# Patient Record
Sex: FEMALE | Race: WHITE | NOT HISPANIC OR LATINO
[De-identification: names, ages, dates, MRNs, and addresses within clinical notes are randomized per-mention and may not be internally consistent; named-entity substitution may affect disease eponyms.]

---

## 2017-07-03 ENCOUNTER — APPOINTMENT (OUTPATIENT)
Dept: MRI IMAGING | Facility: IMAGING CENTER | Age: 62
End: 2017-07-03

## 2017-07-03 ENCOUNTER — OUTPATIENT (OUTPATIENT)
Dept: OUTPATIENT SERVICES | Facility: HOSPITAL | Age: 62
LOS: 1 days | End: 2017-07-03
Payer: COMMERCIAL

## 2017-07-03 DIAGNOSIS — Z00.8 ENCOUNTER FOR OTHER GENERAL EXAMINATION: ICD-10-CM

## 2017-07-03 PROCEDURE — 74183 MRI ABD W/O CNTR FLWD CNTR: CPT

## 2017-07-03 PROCEDURE — 82565 ASSAY OF CREATININE: CPT

## 2017-07-03 PROCEDURE — A9585: CPT

## 2017-08-31 ENCOUNTER — APPOINTMENT (OUTPATIENT)
Age: 62
End: 2017-08-31

## 2018-07-26 ENCOUNTER — RESULT REVIEW (OUTPATIENT)
Age: 63
End: 2018-07-26

## 2021-01-19 ENCOUNTER — NON-APPOINTMENT (OUTPATIENT)
Age: 66
End: 2021-01-19

## 2021-01-20 ENCOUNTER — APPOINTMENT (OUTPATIENT)
Dept: DERMATOLOGY | Facility: CLINIC | Age: 66
End: 2021-01-20
Payer: MEDICARE

## 2021-01-20 ENCOUNTER — NON-APPOINTMENT (OUTPATIENT)
Age: 66
End: 2021-01-20

## 2021-01-20 PROCEDURE — 99203 OFFICE O/P NEW LOW 30 MIN: CPT | Mod: 25,Q5

## 2021-01-20 PROCEDURE — 99072 ADDL SUPL MATRL&STAF TM PHE: CPT

## 2021-01-20 PROCEDURE — 17003 DESTRUCT PREMALG LES 2-14: CPT

## 2021-01-20 PROCEDURE — 17000 DESTRUCT PREMALG LESION: CPT

## 2021-07-12 ENCOUNTER — APPOINTMENT (OUTPATIENT)
Dept: DERMATOLOGY | Facility: CLINIC | Age: 66
End: 2021-07-12
Payer: MEDICARE

## 2021-07-12 PROCEDURE — 17000 DESTRUCT PREMALG LESION: CPT

## 2021-07-12 PROCEDURE — 17003 DESTRUCT PREMALG LES 2-14: CPT

## 2021-07-12 PROCEDURE — 99213 OFFICE O/P EST LOW 20 MIN: CPT | Mod: 25

## 2022-01-03 ENCOUNTER — APPOINTMENT (OUTPATIENT)
Dept: DERMATOLOGY | Facility: CLINIC | Age: 67
End: 2022-01-03

## 2022-01-03 ENCOUNTER — APPOINTMENT (OUTPATIENT)
Dept: DERMATOLOGY | Facility: CLINIC | Age: 67
End: 2022-01-03
Payer: MEDICARE

## 2022-01-03 DIAGNOSIS — D48.5 NEOPLASM OF UNCERTAIN BEHAVIOR OF SKIN: ICD-10-CM

## 2022-01-03 DIAGNOSIS — L57.0 ACTINIC KERATOSIS: ICD-10-CM

## 2022-01-03 PROCEDURE — 99214 OFFICE O/P EST MOD 30 MIN: CPT

## 2022-07-26 ENCOUNTER — APPOINTMENT (OUTPATIENT)
Dept: DERMATOLOGY | Facility: CLINIC | Age: 67
End: 2022-07-26

## 2022-07-26 DIAGNOSIS — L81.4 OTHER MELANIN HYPERPIGMENTATION: ICD-10-CM

## 2022-07-26 PROCEDURE — 99213 OFFICE O/P EST LOW 20 MIN: CPT

## 2023-01-25 ENCOUNTER — APPOINTMENT (OUTPATIENT)
Dept: DERMATOLOGY | Facility: CLINIC | Age: 68
End: 2023-01-25

## 2023-01-31 ENCOUNTER — APPOINTMENT (OUTPATIENT)
Dept: DERMATOLOGY | Facility: CLINIC | Age: 68
End: 2023-01-31
Payer: MEDICARE

## 2023-01-31 PROCEDURE — 99214 OFFICE O/P EST MOD 30 MIN: CPT

## 2023-02-23 ENCOUNTER — NON-APPOINTMENT (OUTPATIENT)
Age: 68
End: 2023-02-23

## 2023-07-04 ENCOUNTER — TRANSCRIPTION ENCOUNTER (OUTPATIENT)
Age: 68
End: 2023-07-04

## 2023-07-05 ENCOUNTER — APPOINTMENT (OUTPATIENT)
Dept: DERMATOLOGY | Facility: CLINIC | Age: 68
End: 2023-07-05
Payer: MEDICARE

## 2023-07-05 PROCEDURE — 99213 OFFICE O/P EST LOW 20 MIN: CPT

## 2023-09-09 ENCOUNTER — APPOINTMENT (OUTPATIENT)
Dept: CT IMAGING | Facility: IMAGING CENTER | Age: 68
End: 2023-09-09
Payer: MEDICARE

## 2023-09-09 ENCOUNTER — OUTPATIENT (OUTPATIENT)
Dept: OUTPATIENT SERVICES | Facility: HOSPITAL | Age: 68
LOS: 1 days | End: 2023-09-09
Payer: COMMERCIAL

## 2023-09-09 DIAGNOSIS — Z00.8 ENCOUNTER FOR OTHER GENERAL EXAMINATION: ICD-10-CM

## 2023-09-09 PROCEDURE — 74177 CT ABD & PELVIS W/CONTRAST: CPT

## 2023-09-09 PROCEDURE — 74177 CT ABD & PELVIS W/CONTRAST: CPT | Mod: 26

## 2023-10-16 ENCOUNTER — INPATIENT (INPATIENT)
Facility: HOSPITAL | Age: 68
LOS: 3 days | Discharge: HOME | End: 2023-10-20
Attending: INTERNAL MEDICINE | Admitting: INTERNAL MEDICINE
Payer: MEDICARE

## 2023-10-16 ENCOUNTER — OUTPATIENT (OUTPATIENT)
Dept: OUTPATIENT SERVICES | Facility: HOSPITAL | Age: 68
LOS: 1 days | End: 2023-10-16
Payer: MEDICARE

## 2023-10-16 VITALS
SYSTOLIC BLOOD PRESSURE: 105 MMHG | HEART RATE: 60 BPM | OXYGEN SATURATION: 100 % | DIASTOLIC BLOOD PRESSURE: 59 MMHG | TEMPERATURE: 98 F | RESPIRATION RATE: 16 BRPM

## 2023-10-16 DIAGNOSIS — Z79.899 OTHER LONG TERM (CURRENT) DRUG THERAPY: ICD-10-CM

## 2023-10-16 DIAGNOSIS — I50.33 ACUTE ON CHRONIC DIASTOLIC (CONGESTIVE) HEART FAILURE: ICD-10-CM

## 2023-10-16 DIAGNOSIS — R06.02 SHORTNESS OF BREATH: ICD-10-CM

## 2023-10-16 DIAGNOSIS — R53.1 WEAKNESS: ICD-10-CM

## 2023-10-16 DIAGNOSIS — E87.1 HYPO-OSMOLALITY AND HYPONATREMIA: ICD-10-CM

## 2023-10-16 DIAGNOSIS — Z90.722 ACQUIRED ABSENCE OF OVARIES, BILATERAL: Chronic | ICD-10-CM

## 2023-10-16 DIAGNOSIS — Z29.9 ENCOUNTER FOR PROPHYLACTIC MEASURES, UNSPECIFIED: ICD-10-CM

## 2023-10-16 DIAGNOSIS — I34.0 NONRHEUMATIC MITRAL (VALVE) INSUFFICIENCY: ICD-10-CM

## 2023-10-16 DIAGNOSIS — E03.9 HYPOTHYROIDISM, UNSPECIFIED: ICD-10-CM

## 2023-10-16 LAB
ALBUMIN SERPL ELPH-MCNC: 3.8 G/DL — SIGNIFICANT CHANGE UP (ref 3.3–5)
ALBUMIN SERPL ELPH-MCNC: 3.8 G/DL — SIGNIFICANT CHANGE UP (ref 3.3–5)
ALP SERPL-CCNC: 55 U/L — SIGNIFICANT CHANGE UP (ref 40–120)
ALP SERPL-CCNC: 55 U/L — SIGNIFICANT CHANGE UP (ref 40–120)
ALT FLD-CCNC: 38 U/L — HIGH (ref 4–33)
ALT FLD-CCNC: 38 U/L — HIGH (ref 4–33)
ANION GAP SERPL CALC-SCNC: 14 MMOL/L — SIGNIFICANT CHANGE UP (ref 7–14)
ANION GAP SERPL CALC-SCNC: 14 MMOL/L — SIGNIFICANT CHANGE UP (ref 7–14)
ANION GAP SERPL CALC-SCNC: 7 MMOL/L — SIGNIFICANT CHANGE UP (ref 7–14)
APPEARANCE UR: CLEAR — SIGNIFICANT CHANGE UP
APPEARANCE UR: CLEAR — SIGNIFICANT CHANGE UP
APTT BLD: 21.6 SEC — LOW (ref 24.5–35.6)
APTT BLD: 21.6 SEC — LOW (ref 24.5–35.6)
AST SERPL-CCNC: 69 U/L — HIGH (ref 4–32)
AST SERPL-CCNC: 69 U/L — HIGH (ref 4–32)
BACTERIA # UR AUTO: NEGATIVE /HPF — SIGNIFICANT CHANGE UP
BACTERIA # UR AUTO: NEGATIVE /HPF — SIGNIFICANT CHANGE UP
BILIRUB SERPL-MCNC: 0.5 MG/DL — SIGNIFICANT CHANGE UP (ref 0.2–1.2)
BILIRUB SERPL-MCNC: 0.5 MG/DL — SIGNIFICANT CHANGE UP (ref 0.2–1.2)
BILIRUB UR-MCNC: NEGATIVE — SIGNIFICANT CHANGE UP
BILIRUB UR-MCNC: NEGATIVE — SIGNIFICANT CHANGE UP
BUN SERPL-MCNC: 35 MG/DL — HIGH (ref 7–23)
BUN SERPL-MCNC: 39 MG/DL — HIGH (ref 7–23)
BUN SERPL-MCNC: 39 MG/DL — HIGH (ref 7–23)
CALCIUM SERPL-MCNC: 8.4 MG/DL — SIGNIFICANT CHANGE UP (ref 8.4–10.5)
CALCIUM SERPL-MCNC: 8.6 MG/DL — SIGNIFICANT CHANGE UP (ref 8.4–10.5)
CALCIUM SERPL-MCNC: 8.6 MG/DL — SIGNIFICANT CHANGE UP (ref 8.4–10.5)
CAST: 0 /LPF — SIGNIFICANT CHANGE UP (ref 0–4)
CAST: 0 /LPF — SIGNIFICANT CHANGE UP (ref 0–4)
CHLORIDE SERPL-SCNC: 86 MMOL/L — LOW (ref 98–107)
CHLORIDE SERPL-SCNC: 88 MMOL/L — LOW (ref 98–107)
CHLORIDE SERPL-SCNC: 88 MMOL/L — LOW (ref 98–107)
CO2 SERPL-SCNC: 27 MMOL/L — SIGNIFICANT CHANGE UP (ref 22–31)
CO2 SERPL-SCNC: 27 MMOL/L — SIGNIFICANT CHANGE UP (ref 22–31)
CO2 SERPL-SCNC: 32 MMOL/L — HIGH (ref 22–31)
COLOR SPEC: YELLOW — SIGNIFICANT CHANGE UP
COLOR SPEC: YELLOW — SIGNIFICANT CHANGE UP
CREAT SERPL-MCNC: 0.46 MG/DL — LOW (ref 0.5–1.3)
CREAT SERPL-MCNC: 0.46 MG/DL — LOW (ref 0.5–1.3)
CREAT SERPL-MCNC: 0.51 MG/DL — SIGNIFICANT CHANGE UP (ref 0.5–1.3)
DIFF PNL FLD: ABNORMAL
DIFF PNL FLD: ABNORMAL
EGFR: 102 ML/MIN/1.73M2 — SIGNIFICANT CHANGE UP
EGFR: 104 ML/MIN/1.73M2 — SIGNIFICANT CHANGE UP
EGFR: 104 ML/MIN/1.73M2 — SIGNIFICANT CHANGE UP
GLUCOSE SERPL-MCNC: 65 MG/DL — LOW (ref 70–99)
GLUCOSE SERPL-MCNC: 65 MG/DL — LOW (ref 70–99)
GLUCOSE SERPL-MCNC: 70 MG/DL — SIGNIFICANT CHANGE UP (ref 70–99)
GLUCOSE UR QL: NEGATIVE MG/DL — SIGNIFICANT CHANGE UP
GLUCOSE UR QL: NEGATIVE MG/DL — SIGNIFICANT CHANGE UP
HCT VFR BLD CALC: 30.7 % — LOW (ref 34.5–45)
HCT VFR BLD CALC: 34.5 % — SIGNIFICANT CHANGE UP (ref 34.5–45)
HCT VFR BLD CALC: 34.5 % — SIGNIFICANT CHANGE UP (ref 34.5–45)
HGB BLD-MCNC: 11.3 G/DL — LOW (ref 11.5–15.5)
HGB BLD-MCNC: 12.5 G/DL — SIGNIFICANT CHANGE UP (ref 11.5–15.5)
HGB BLD-MCNC: 12.5 G/DL — SIGNIFICANT CHANGE UP (ref 11.5–15.5)
INR BLD: 1.37 RATIO — HIGH (ref 0.85–1.18)
INR BLD: 1.37 RATIO — HIGH (ref 0.85–1.18)
KETONES UR-MCNC: NEGATIVE MG/DL — SIGNIFICANT CHANGE UP
KETONES UR-MCNC: NEGATIVE MG/DL — SIGNIFICANT CHANGE UP
LEUKOCYTE ESTERASE UR-ACNC: NEGATIVE — SIGNIFICANT CHANGE UP
LEUKOCYTE ESTERASE UR-ACNC: NEGATIVE — SIGNIFICANT CHANGE UP
MAGNESIUM SERPL-MCNC: 2.6 MG/DL — SIGNIFICANT CHANGE UP (ref 1.6–2.6)
MAGNESIUM SERPL-MCNC: 2.6 MG/DL — SIGNIFICANT CHANGE UP (ref 1.6–2.6)
MCHC RBC-ENTMCNC: 35.6 PG — HIGH (ref 27–34)
MCHC RBC-ENTMCNC: 35.7 PG — HIGH (ref 27–34)
MCHC RBC-ENTMCNC: 35.7 PG — HIGH (ref 27–34)
MCHC RBC-ENTMCNC: 36.2 GM/DL — HIGH (ref 32–36)
MCHC RBC-ENTMCNC: 36.2 GM/DL — HIGH (ref 32–36)
MCHC RBC-ENTMCNC: 36.8 GM/DL — HIGH (ref 32–36)
MCV RBC AUTO: 96.8 FL — SIGNIFICANT CHANGE UP (ref 80–100)
MCV RBC AUTO: 98.6 FL — SIGNIFICANT CHANGE UP (ref 80–100)
MCV RBC AUTO: 98.6 FL — SIGNIFICANT CHANGE UP (ref 80–100)
NITRITE UR-MCNC: NEGATIVE — SIGNIFICANT CHANGE UP
NITRITE UR-MCNC: NEGATIVE — SIGNIFICANT CHANGE UP
NRBC # BLD: 0 /100 WBCS — SIGNIFICANT CHANGE UP (ref 0–0)
NRBC # FLD: 0 K/UL — SIGNIFICANT CHANGE UP (ref 0–0)
PH UR: 7.5 — SIGNIFICANT CHANGE UP (ref 5–8)
PH UR: 7.5 — SIGNIFICANT CHANGE UP (ref 5–8)
PHOSPHATE SERPL-MCNC: 4.5 MG/DL — SIGNIFICANT CHANGE UP (ref 2.5–4.5)
PHOSPHATE SERPL-MCNC: 4.5 MG/DL — SIGNIFICANT CHANGE UP (ref 2.5–4.5)
PLATELET # BLD AUTO: 141 K/UL — LOW (ref 150–400)
PLATELET # BLD AUTO: 141 K/UL — LOW (ref 150–400)
PLATELET # BLD AUTO: 157 K/UL — SIGNIFICANT CHANGE UP (ref 150–400)
POTASSIUM SERPL-MCNC: 3.6 MMOL/L — SIGNIFICANT CHANGE UP (ref 3.5–5.3)
POTASSIUM SERPL-MCNC: 3.6 MMOL/L — SIGNIFICANT CHANGE UP (ref 3.5–5.3)
POTASSIUM SERPL-MCNC: 4.1 MMOL/L — SIGNIFICANT CHANGE UP (ref 3.5–5.3)
POTASSIUM SERPL-SCNC: 3.6 MMOL/L — SIGNIFICANT CHANGE UP (ref 3.5–5.3)
POTASSIUM SERPL-SCNC: 3.6 MMOL/L — SIGNIFICANT CHANGE UP (ref 3.5–5.3)
POTASSIUM SERPL-SCNC: 4.1 MMOL/L — SIGNIFICANT CHANGE UP (ref 3.5–5.3)
PROT SERPL-MCNC: 5.8 G/DL — LOW (ref 6–8.3)
PROT SERPL-MCNC: 5.8 G/DL — LOW (ref 6–8.3)
PROT UR-MCNC: NEGATIVE MG/DL — SIGNIFICANT CHANGE UP
PROT UR-MCNC: NEGATIVE MG/DL — SIGNIFICANT CHANGE UP
PROTHROM AB SERPL-ACNC: 15.2 SEC — HIGH (ref 9.5–13)
PROTHROM AB SERPL-ACNC: 15.2 SEC — HIGH (ref 9.5–13)
RBC # BLD: 3.17 M/UL — LOW (ref 3.8–5.2)
RBC # BLD: 3.5 M/UL — LOW (ref 3.8–5.2)
RBC # BLD: 3.5 M/UL — LOW (ref 3.8–5.2)
RBC # FLD: 12 % — SIGNIFICANT CHANGE UP (ref 10.3–14.5)
RBC CASTS # UR COMP ASSIST: 4 /HPF — SIGNIFICANT CHANGE UP (ref 0–4)
RBC CASTS # UR COMP ASSIST: 4 /HPF — SIGNIFICANT CHANGE UP (ref 0–4)
SODIUM SERPL-SCNC: 125 MMOL/L — LOW (ref 135–145)
SODIUM SERPL-SCNC: 129 MMOL/L — LOW (ref 135–145)
SODIUM SERPL-SCNC: 129 MMOL/L — LOW (ref 135–145)
SP GR SPEC: 1.01 — SIGNIFICANT CHANGE UP (ref 1–1.03)
SP GR SPEC: 1.01 — SIGNIFICANT CHANGE UP (ref 1–1.03)
SQUAMOUS # UR AUTO: 0 /HPF — SIGNIFICANT CHANGE UP (ref 0–5)
SQUAMOUS # UR AUTO: 0 /HPF — SIGNIFICANT CHANGE UP (ref 0–5)
TSH SERPL-MCNC: 3.1 UIU/ML — SIGNIFICANT CHANGE UP (ref 0.27–4.2)
TSH SERPL-MCNC: 3.1 UIU/ML — SIGNIFICANT CHANGE UP (ref 0.27–4.2)
URATE SERPL-MCNC: 3.6 MG/DL — SIGNIFICANT CHANGE UP (ref 2.5–7)
URATE SERPL-MCNC: 3.6 MG/DL — SIGNIFICANT CHANGE UP (ref 2.5–7)
UROBILINOGEN FLD QL: 0.2 MG/DL — SIGNIFICANT CHANGE UP (ref 0.2–1)
UROBILINOGEN FLD QL: 0.2 MG/DL — SIGNIFICANT CHANGE UP (ref 0.2–1)
WBC # BLD: 4.52 K/UL — SIGNIFICANT CHANGE UP (ref 3.8–10.5)
WBC # BLD: 5.32 K/UL — SIGNIFICANT CHANGE UP (ref 3.8–10.5)
WBC # BLD: 5.32 K/UL — SIGNIFICANT CHANGE UP (ref 3.8–10.5)
WBC # FLD AUTO: 4.52 K/UL — SIGNIFICANT CHANGE UP (ref 3.8–10.5)
WBC # FLD AUTO: 5.32 K/UL — SIGNIFICANT CHANGE UP (ref 3.8–10.5)
WBC # FLD AUTO: 5.32 K/UL — SIGNIFICANT CHANGE UP (ref 3.8–10.5)
WBC UR QL: 0 /HPF — SIGNIFICANT CHANGE UP (ref 0–5)
WBC UR QL: 0 /HPF — SIGNIFICANT CHANGE UP (ref 0–5)

## 2023-10-16 PROCEDURE — 93320 DOPPLER ECHO COMPLETE: CPT | Mod: 26,GC

## 2023-10-16 PROCEDURE — 93010 ELECTROCARDIOGRAM REPORT: CPT | Mod: 59

## 2023-10-16 PROCEDURE — 93325 DOPPLER ECHO COLOR FLOW MAPG: CPT | Mod: 26,GC

## 2023-10-16 PROCEDURE — 93312 ECHO TRANSESOPHAGEAL: CPT | Mod: 26

## 2023-10-16 PROCEDURE — 76376 3D RENDER W/INTRP POSTPROCES: CPT | Mod: 26

## 2023-10-16 PROCEDURE — 99223 1ST HOSP IP/OBS HIGH 75: CPT

## 2023-10-16 RX ORDER — TOLVAPTAN 15 MG/1
15 TABLET ORAL ONCE
Refills: 0 | Status: DISCONTINUED | OUTPATIENT
Start: 2023-10-16 | End: 2023-10-16

## 2023-10-16 RX ORDER — LEVOTHYROXINE SODIUM 125 MCG
25 TABLET ORAL DAILY
Refills: 0 | Status: DISCONTINUED | OUTPATIENT
Start: 2023-10-16 | End: 2023-10-20

## 2023-10-16 RX ORDER — FUROSEMIDE 40 MG
40 TABLET ORAL
Refills: 0 | Status: DISCONTINUED | OUTPATIENT
Start: 2023-10-16 | End: 2023-10-17

## 2023-10-16 RX ORDER — TOLVAPTAN 15 MG/1
15 TABLET ORAL ONCE
Refills: 0 | Status: COMPLETED | OUTPATIENT
Start: 2023-10-16 | End: 2023-10-16

## 2023-10-16 RX ORDER — SODIUM CHLORIDE 9 MG/ML
3 INJECTION INTRAMUSCULAR; INTRAVENOUS; SUBCUTANEOUS EVERY 8 HOURS
Refills: 0 | Status: DISCONTINUED | OUTPATIENT
Start: 2023-10-16 | End: 2023-10-30

## 2023-10-16 RX ORDER — POLYETHYLENE GLYCOL 3350 17 G/17G
17 POWDER, FOR SOLUTION ORAL
Refills: 0 | Status: COMPLETED | OUTPATIENT
Start: 2023-10-16 | End: 2023-10-17

## 2023-10-16 RX ORDER — HEPARIN SODIUM 5000 [USP'U]/ML
5000 INJECTION INTRAVENOUS; SUBCUTANEOUS EVERY 8 HOURS
Refills: 0 | Status: DISCONTINUED | OUTPATIENT
Start: 2023-10-16 | End: 2023-10-20

## 2023-10-16 RX ORDER — ENOXAPARIN SODIUM 100 MG/ML
40 INJECTION SUBCUTANEOUS EVERY 24 HOURS
Refills: 0 | Status: DISCONTINUED | OUTPATIENT
Start: 2023-10-16 | End: 2023-10-16

## 2023-10-16 RX ADMIN — HEPARIN SODIUM 5000 UNIT(S): 5000 INJECTION INTRAVENOUS; SUBCUTANEOUS at 22:40

## 2023-10-16 RX ADMIN — POLYETHYLENE GLYCOL 3350 17 GRAM(S): 17 POWDER, FOR SOLUTION ORAL at 21:20

## 2023-10-16 RX ADMIN — Medication 40 MILLIGRAM(S): at 21:19

## 2023-10-16 NOTE — H&P ADULT - NSHPPHYSICALEXAM_GEN_ALL_CORE
PHYSICAL EXAM:      Constitutional: NAD, well-groomed, frail appearing   HEENT:  EOMI, Normal Hearing, periorbital ecchymosis   Neck: No LAD, No JVD  Back: Normal spine flexure, No CVA tenderness  Respiratory: CTAB  Cardiovascular: S1 and S2, RRR  Gastrointestinal: BS+, soft, NT/ND  Extremities: + peripheral edema  Vascular: 2+ peripheral pulses  Neurological: A/O x 3  Psychiatric: Normal mood, normal affect  Musculoskeletal: 4/5 strength b/l upper; 3-4 lower extremities  Skin: No rashes

## 2023-10-16 NOTE — H&P ADULT - NSHPOUTPATIENTPROVIDERS_GEN_ALL_CORE
January 7, 2019                 [facility]  Pediatrics  [id number] [place] [id number]  Phone: [phone]  Fax: [phone]   January 7, 2019     Patient: Evaristo Celeste   YOB: 2004   Date of Visit: 1/7/2019       To Whom it May Concern:    Evaristo Celeste was seen in my clinic on 1/7/2019. He may return to school on 1/8/2019.    If you have any questions or concerns, please don't hesitate to call.    Sincerely,             Shari Hanson MD      Dr Singleton- cards

## 2023-10-16 NOTE — H&P CARDIOLOGY - HISTORY OF PRESENT ILLNESS
67 y.o. female presents today for elective JAVIER.   NPO Date and Time -   Mallampati-   Anticoagulation Date and Time -   Previous Endoscopy?  NO  Hx of CVA?  NO  Sleep Apnea?  NO  Dentures? NO  Loose Teeth? NO      Patient Denies:    Prior difficult intubation or airway problems  Odynophagia  Dysphagia  Esophageal stricture  Esophageal tumor  Esophageal varices  Esophageal perforation/laceration  Esophageal diverticulum  Large diaphragmatic Hernia  Active or recent upper gastrointestinal (GI) bleed  History of GI surgery  History of Esophageal surgery  History of Rivas's esophagus  Tenuous cardiorespiratory status  Cervical spine arthritis with reduced range of motion or atlantoaxial joint disease. History of radiation to head, neck, or mediastinum  Severe thrombocytopenia             67 y.o. female presents today for elective JAVIER due to MVR. The patient c/o SOB with exertion started in 5/2023, getting progressively worse. She admits to b/l lower extremities edema, was evaluated by her cardiologist, started on diuretics, however she did not noticed much improvement. The patient also claims that lately feels very tired and has no energy, lost 10 Lbs, The patient denies chest pain,  palpitations, dizziness, presyncope, syncope,  headache, visual disturbances, CVA, PE, DVT, KARLEE, abdominal pain, N/V/D, hematochezia, melena, decrease in appetite, dysuria, hematuria, fever, chills.  The patient is s/p fall on her face, 1 week ago, as she tripped in her house. As per patient, she had full evaluation by her PMD, no fractures noted.     NPO Date and Time - 10/15/23 at 20:15  Mallampati- I  Anticoagulation Date and Time - NONE  Previous Endoscopy?  YES  Hx of CVA?  NO  Sleep Apnea?  NO  Dentures? NO  Loose Teeth? NO  Thrombocytopenia - YES      Patient Denies:    Prior difficult intubation or airway problems  Odynophagia  Dysphagia  Esophageal stricture  Esophageal tumor  Esophageal varices  Esophageal perforation/laceration  Esophageal diverticulum  Large diaphragmatic Hernia  Active or recent upper gastrointestinal (GI) bleed  History of GI surgery  History of Esophageal surgery  History of Rivas's esophagus  Tenuous cardiorespiratory status  Cervical spine arthritis with reduced range of motion or atlantoaxial joint disease. History of radiation to head, neck, or mediastinum

## 2023-10-16 NOTE — CONSULT NOTE ADULT - SUBJECTIVE AND OBJECTIVE BOX
NEPHROLOGY - NSN    Patient seen and examined.    HPI:  67 y.o. female presents today for elective JAVIER due to MVR. The patient c/o SOB with exertion started in 5/2023, getting progressively worse. She admits to b/l lower extremities edema, was evaluated by her cardiologist, started on diuretics, however she did not noticed much improvement. The patient also claims that lately feels very tired and has no energy, lost 10 Lbs, The patient denies chest pain,  palpitations, dizziness, presyncope, syncope,  headache, visual disturbances, CVA, PE, DVT, KARLEE, abdominal pain, N/V/D, hematochezia, melena, decrease in appetite, dysuria, hematuria, fever, chills.  The patient is s/p fall on her face, 1 week ago, as she tripped in her house. As per patient, she had full evaluation by her PMD, no fractures noted.  JAVIER had severe MR  She has hyponatremia and admitted for management       PAST MEDICAL & SURGICAL HISTORY:  Liver hemangioma      Hypothyroidism      Mitral valve regurgitation      HLD (hyperlipidemia)      Cystocele, unspecified      Constipation      Anemia      History of thrombocytopenia      Chronic kidney disease (CKD)      CA skin, basal cell      H/O melanoma excision      OA (osteoarthritis)      H/O bilateral oophorectomy          MEDICATIONS  (STANDING):      Allergies    No Known Allergies    Intolerances        SOCIAL HISTORY:  Denies alcohol abuse, drug abuse or tobacco usage.     FAMILY HISTORY:  No pertinent family history in first degree relatives           REVIEW OF SYSTEMS:  +  fatigue or weakness. All other pertinent systems are reviewed and are negative.    PHYSICAL EXAM:  Constitutional: NAD  HEENT: EOMI  Neck:  No JVD, supple   Respiratory: CTA B/L  Cardiovascular: S1 and S2, RRR  Gastrointestinal: + BS, soft, NT, ND  Extremities: +  peripheral edema, + peripheral pulses  Neurological: A/O x 3, CN2-12 intact  Psychiatric: Normal mood, normal affect  : No Dinero  Skin: No rashes, C/D/I  Access: Not applicable    I and O's:        LABS:                        11.3   4.52  )-----------( 157      ( 16 Oct 2023 10:40 )             30.7     10-16    125<L>  |  86<L>  |  35<H>  ----------------------------<  70  4.1   |  32<H>  |  0.51    Ca    8.4      16 Oct 2023 10:40        URINE:  Urinalysis Basic - ( 16 Oct 2023 10:40 )    Color: x / Appearance: x / SG: x / pH: x  Gluc: 70 mg/dL / Ketone: x  / Bili: x / Urobili: x   Blood: x / Protein: x / Nitrite: x   Leuk Esterase: x / RBC: x / WBC x   Sq Epi: x / Non Sq Epi: x / Bacteria: x        RADIOLOGY & ADDITIONAL STUDIES:    < from: CT Abdomen and Pelvis w/ Oral Cont and w/ IV Cont (09.09.23 @ 10:17) >    EXAM: [id number] - CT ABDOMEN AND PELVIS OC IC  - ORDERED BY:  KWESI ORTIZ      PROCEDURE DATE:  09/09/2023           INTERPRETATION:  CLINICAL INFORMATION: Weight loss    COMPARISON: MR abdomen 7/3/2017. CT abdomen pelvis 10/12/2016.    CONTRAST/COMPLICATIONS:  IV Contrast: Omnipaque 350  90 cc administered   10 cc discarded  Oral Contrast: Omnipaque 300  Complications: None reported at time of study completion    PROCEDURE:  CT of the Abdomen and Pelvis was performed.  Sagittal and coronal reformats were performed.    FINDINGS:  LOWER CHEST: Partially visualized 0.5 cm right lower lobe nodule (2, 1).   Multiple right middle lobe tree-in-bud nodules.    LIVER: 3 peripherally enhancing hepatic lesions, previously characterized   as hemangiomas, are unchanged in appearance with the largest measuring   4.2 x 4.2 cm (2, 29).  BILE DUCTS: Normal caliber.  GALLBLADDER: Within normal limits.  SPLEEN: Within normal limits.  PANCREAS: Within normal limits.  ADRENALS: Within normal limits.  KIDNEYS/URETERS: Within normal limits.    BLADDER: Within normal limits.  REPRODUCTIVE ORGANS: Fibroid uterus.    BOWEL: No bowel obstruction. Appendix is not visualized.  PERITONEUM: No ascites.  VESSELS: Atherosclerotic changes.  RETROPERITONEUM/LYMPH NODES: No lymphadenopathy.  ABDOMINAL WALL: Within normal limits.  BONES: Degenerative changes.    IMPRESSION:  Partially visualized 0.5 cm right lower lobe nodule, which can be further   evaluated with a dedicated non contrast CT Chest.    Multiple right middle lobe tree and bud nodules are likely infectous   and/or inflammatory in nature.    No acute findings in the abdomen or pelvis.    --- End of Report ---              JANET CARTER MD; Resident Radiologist  This document has been electronically signed.  MICHELLE FITZGERALD MD; Attending Radiologist   This document has been electronically signed. Sep 12 2023  8:04PM    < end of copied text >

## 2023-10-16 NOTE — H&P ADULT - PROBLEM SELECTOR PLAN 1
-follow results of JAVIER  -c/w tele, official cards eval called  -placed on 40 IV lasix,  follow Is/Os  -ekg ordered; pt denies cp; notes baseline loving

## 2023-10-16 NOTE — H&P CARDIOLOGY - NSICDXPASTMEDICALHX_GEN_ALL_CORE_FT
PAST MEDICAL HISTORY:  Anemia     CA skin, basal cell     Chronic kidney disease (CKD)     Constipation     Cystocele, unspecified     H/O melanoma excision     History of thrombocytopenia     HLD (hyperlipidemia)     Hypothyroidism     Liver hemangioma     Mitral valve regurgitation     OA (osteoarthritis)

## 2023-10-16 NOTE — H&P ADULT - NSHPREVIEWOFSYSTEMS_GEN_ALL_CORE
Review of Systems:   CONSTITUTIONAL: +  fatigue  EYES: No eye pain, visual disturbances, or discharge  ENMT:  No difficulty hearing, tinnitus, vertigo; No sinus or throat pain  NECK: No pain or stiffness  RESPIRATORY: No cough, wheezing, chills or hemoptysis; unchanged exertional  shortness of breath  CARDIOVASCULAR: No chest pain, palpitations, dizziness; + leg swelling  GASTROINTESTINAL: No abdominal or epigastric pain. No nausea, vomiting, or hematemesis; No diarrhea or constipation. No melena or hematochezia.  GENITOURINARY: No dysuria, frequency, hematuria, or incontinence  NEUROLOGICAL: No headaches  SKIN: No itching, burning, rashes, or lesions   MUSCULOSKELETAL: No joint pain or swelling; No muscle, back, or extremity pain

## 2023-10-16 NOTE — PATIENT PROFILE ADULT - FALL HARM RISK - HARM RISK INTERVENTIONS
Assistance OOB with selected safe patient handling equipment/Communicate Risk of Fall with Harm to all staff/Reinforce activity limits and safety measures with patient and family/Tailored Fall Risk Interventions/Toileting schedule using arm’s reach rule for commode and bathroom/Visual Cue: Yellow wristband and red socks/Bed in lowest position, wheels locked, appropriate side rails in place/Call bell, personal items and telephone in reach/Instruct patient to call for assistance before getting out of bed or chair/Non-slip footwear when patient is out of bed/[place] to call system/Physically safe environment - no spills, clutter or unnecessary equipment/Purposeful Proactive Rounding/Room/bathroom lighting operational, light cord in reach

## 2023-10-16 NOTE — H&P ADULT - NSHPLABSRESULTS_GEN_ALL_CORE
11.3   4.52  )-----------( 157      ( 16 Oct 2023 10:40 )             30.7     10-16    125<L>  |  86<L>  |  35<H>  ----------------------------<  70  4.1   |  32<H>  |  0.51    Ca    8.4      16 Oct 2023 10:40      CAPILLARY BLOOD GLUCOSE          Urinalysis Basic - ( 16 Oct 2023 10:40 )    Color: x / Appearance: x / SG: x / pH: x  Gluc: 70 mg/dL / Ketone: x  / Bili: x / Urobili: x   Blood: x / Protein: x / Nitrite: x   Leuk Esterase: x / RBC: x / WBC x   Sq Epi: x / Non Sq Epi: x / Bacteria: x      Vital Signs Last 24 Hrs  T(C): 36.7 (16 Oct 2023 19:40), Max: 36.7 (16 Oct 2023 19:40)  T(F): 98 (16 Oct 2023 19:40), Max: 98 (16 Oct 2023 19:40)  HR: 60 (16 Oct 2023 19:40) (60 - 60)  BP: 105/59 (16 Oct 2023 19:40) (105/59 - 105/59)  BP(mean): --  RR: 16 (16 Oct 2023 19:40) (16 - 16)  SpO2: 100% (16 Oct 2023 19:40) (100% - 100%)    Parameters below as of 16 Oct 2023 19:40  Patient On (Oxygen Delivery Method): room air

## 2023-10-16 NOTE — H&P ADULT - HISTORY OF PRESENT ILLNESS
67 y.o. female h/o MV regurg, and hypothyroidism on synthroid;  presented today for elective JAVIER due to MVR. The patient c/o profound SOB with exertion started in 5/2023, getting progressively worse as well as b/l lower extremity edema;  was evaluated by her cardiologist, started on lasix; has taken 10 mg/daily x 4 days and notes improved swelling, but not improved VACA.  The patient denies chest pain,  palpitations, dizziness, presyncope, syncope,  headache, visual disturbances, The patient is s/p fall on her face, 1 week ago, as she tripped in her house. As per patient, she had full evaluation by her PMD, no fractures noted. Reports walker dependence over last month as she notes difficulty lifting her legs to walk properly  Pt underwent JAVIER but noted to be hyponatremic (125) and admitted

## 2023-10-16 NOTE — CHART NOTE - NSCHARTNOTEFT_GEN_A_CORE
Switched enoxaparin to HSQ q8hr d/t low body weight and Renal function. Previous enoxaparin dose too high for low weight (37kg). Discussed with pharmacy for choosing ppx agent.   - F/u in AM w/cardiology

## 2023-10-16 NOTE — CONSULT NOTE ADULT - ASSESSMENT
67 y.o. female presents today for elective JAVIER due to MVR.  Hypervolemic hyponatremia   Elevated serum HCO3  Failure to thrive     1 Renal- Lasix 40mg IVP bid to start   This is not SIADH.  Urine lytes will not help   Check TSH please  2 GI-Miralax for the constipation   She has refused protein shakes  Place on regular diet without restriction as protein intake must be uninterrupted   3 CVS-Structural heart disease to be made aware of the JAVIER   To be dc with cozaar for afterload reduction     Sayed [facility]   [phone]    67 y.o. female presents today for elective JAVIER due to MVR.  Hypervolemic hyponatremia   Elevated serum HCO3  Failure to thrive     1 Renal- Lasix 40mg IVP bid to start   This is not SIADH.  Urine lytes will not help   Check TSH please  Samsca 15mg po x 1 for the hyponatremia   2 GI-Miralax for the constipation   She has refused protein shakes  Place on regular diet without restriction as protein intake must be uninterrupted   3 CVS-Structural heart disease to be made aware of the JAVIER   To be dc with cozaar for afterload reduction     Sayed [facility]   [phone]

## 2023-10-16 NOTE — H&P CARDIOLOGY - NS ATTEND AMEND GEN_ALL_CORE FT
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

## 2023-10-16 NOTE — H&P CARDIOLOGY - ADDITIONAL PE
+ b/l  periorbital ecchymosis, + steri strips over upper nasal area, no surrounding erythema.   The patient is unable to ambulate by herself and needed an assistance to move from wheelchair to the stretcher. + b/l  periorbital ecchymosis, + steri strips over upper nasal area, no surrounding erythema.   sacral area - + non blanching erythema, very mild skin tear.  The patient is unable to ambulate by herself and needed an assistance to move from wheelchair to the stretcher. + R periorbital ecchymosis, + steri strips over upper nasal area, no surrounding erythema.   sacral area - + non blanching erythema, very mild skin tear.  The patient is unable to ambulate by herself and needed assistance to move from wheelchair to the stretcher.

## 2023-10-17 PROBLEM — C44.91 BASAL CELL CARCINOMA OF SKIN, UNSPECIFIED: Chronic | Status: ACTIVE | Noted: 2023-10-16

## 2023-10-17 PROBLEM — Z98.890 OTHER SPECIFIED POSTPROCEDURAL STATES: Chronic | Status: ACTIVE | Noted: 2023-10-16

## 2023-10-17 PROBLEM — I34.0 NONRHEUMATIC MITRAL (VALVE) INSUFFICIENCY: Chronic | Status: ACTIVE | Noted: 2023-10-16

## 2023-10-17 PROBLEM — K59.00 CONSTIPATION, UNSPECIFIED: Chronic | Status: ACTIVE | Noted: 2023-10-16

## 2023-10-17 PROBLEM — D64.9 ANEMIA, UNSPECIFIED: Chronic | Status: ACTIVE | Noted: 2023-10-16

## 2023-10-17 PROBLEM — E78.5 HYPERLIPIDEMIA, UNSPECIFIED: Chronic | Status: ACTIVE | Noted: 2023-10-16

## 2023-10-17 PROBLEM — N81.10 CYSTOCELE, UNSPECIFIED: Chronic | Status: ACTIVE | Noted: 2023-10-16

## 2023-10-17 PROBLEM — M19.90 UNSPECIFIED OSTEOARTHRITIS, UNSPECIFIED SITE: Chronic | Status: ACTIVE | Noted: 2023-10-16

## 2023-10-17 PROBLEM — N18.9 CHRONIC KIDNEY DISEASE, UNSPECIFIED: Chronic | Status: ACTIVE | Noted: 2023-10-16

## 2023-10-17 PROBLEM — Z86.2 PERSONAL HISTORY OF DISEASES OF THE BLOOD AND BLOOD-FORMING ORGANS AND CERTAIN DISORDERS INVOLVING THE IMMUNE MECHANISM: Chronic | Status: ACTIVE | Noted: 2023-10-16

## 2023-10-17 PROBLEM — E03.9 HYPOTHYROIDISM, UNSPECIFIED: Chronic | Status: ACTIVE | Noted: 2023-10-16

## 2023-10-17 PROBLEM — D18.03 HEMANGIOMA OF INTRA-ABDOMINAL STRUCTURES: Chronic | Status: ACTIVE | Noted: 2023-10-16

## 2023-10-17 LAB
ALBUMIN SERPL ELPH-MCNC: 3.7 G/DL — SIGNIFICANT CHANGE UP (ref 3.3–5)
ALBUMIN SERPL ELPH-MCNC: 3.7 G/DL — SIGNIFICANT CHANGE UP (ref 3.3–5)
ALP SERPL-CCNC: 57 U/L — SIGNIFICANT CHANGE UP (ref 40–120)
ALP SERPL-CCNC: 57 U/L — SIGNIFICANT CHANGE UP (ref 40–120)
ALT FLD-CCNC: 39 U/L — HIGH (ref 4–33)
ALT FLD-CCNC: 39 U/L — HIGH (ref 4–33)
ANION GAP SERPL CALC-SCNC: 15 MMOL/L — HIGH (ref 7–14)
ANION GAP SERPL CALC-SCNC: 15 MMOL/L — HIGH (ref 7–14)
AST SERPL-CCNC: 69 U/L — HIGH (ref 4–32)
AST SERPL-CCNC: 69 U/L — HIGH (ref 4–32)
BASOPHILS # BLD AUTO: 0 K/UL — SIGNIFICANT CHANGE UP (ref 0–0.2)
BASOPHILS # BLD AUTO: 0 K/UL — SIGNIFICANT CHANGE UP (ref 0–0.2)
BASOPHILS NFR BLD AUTO: 0 % — SIGNIFICANT CHANGE UP (ref 0–2)
BASOPHILS NFR BLD AUTO: 0 % — SIGNIFICANT CHANGE UP (ref 0–2)
BILIRUB SERPL-MCNC: 0.6 MG/DL — SIGNIFICANT CHANGE UP (ref 0.2–1.2)
BILIRUB SERPL-MCNC: 0.6 MG/DL — SIGNIFICANT CHANGE UP (ref 0.2–1.2)
BUN SERPL-MCNC: 40 MG/DL — HIGH (ref 7–23)
BUN SERPL-MCNC: 40 MG/DL — HIGH (ref 7–23)
CALCIUM SERPL-MCNC: 8.3 MG/DL — LOW (ref 8.4–10.5)
CALCIUM SERPL-MCNC: 8.3 MG/DL — LOW (ref 8.4–10.5)
CHLORIDE SERPL-SCNC: 88 MMOL/L — LOW (ref 98–107)
CHLORIDE SERPL-SCNC: 88 MMOL/L — LOW (ref 98–107)
CO2 SERPL-SCNC: 27 MMOL/L — SIGNIFICANT CHANGE UP (ref 22–31)
CO2 SERPL-SCNC: 27 MMOL/L — SIGNIFICANT CHANGE UP (ref 22–31)
CREAT ?TM UR-MCNC: 10 MG/DL — SIGNIFICANT CHANGE UP
CREAT ?TM UR-MCNC: 10 MG/DL — SIGNIFICANT CHANGE UP
CREAT SERPL-MCNC: 0.48 MG/DL — LOW (ref 0.5–1.3)
CREAT SERPL-MCNC: 0.48 MG/DL — LOW (ref 0.5–1.3)
EGFR: 103 ML/MIN/1.73M2 — SIGNIFICANT CHANGE UP
EGFR: 103 ML/MIN/1.73M2 — SIGNIFICANT CHANGE UP
EOSINOPHIL # BLD AUTO: 0.01 K/UL — SIGNIFICANT CHANGE UP (ref 0–0.5)
EOSINOPHIL # BLD AUTO: 0.01 K/UL — SIGNIFICANT CHANGE UP (ref 0–0.5)
EOSINOPHIL NFR BLD AUTO: 0.2 % — SIGNIFICANT CHANGE UP (ref 0–6)
EOSINOPHIL NFR BLD AUTO: 0.2 % — SIGNIFICANT CHANGE UP (ref 0–6)
GLUCOSE SERPL-MCNC: 90 MG/DL — SIGNIFICANT CHANGE UP (ref 70–99)
GLUCOSE SERPL-MCNC: 90 MG/DL — SIGNIFICANT CHANGE UP (ref 70–99)
HCT VFR BLD CALC: 33.9 % — LOW (ref 34.5–45)
HCT VFR BLD CALC: 33.9 % — LOW (ref 34.5–45)
HCV AB S/CO SERPL IA: 0.06 S/CO — SIGNIFICANT CHANGE UP (ref 0–0.99)
HCV AB S/CO SERPL IA: 0.06 S/CO — SIGNIFICANT CHANGE UP (ref 0–0.99)
HCV AB SERPL-IMP: SIGNIFICANT CHANGE UP
HCV AB SERPL-IMP: SIGNIFICANT CHANGE UP
HGB BLD-MCNC: 12.4 G/DL — SIGNIFICANT CHANGE UP (ref 11.5–15.5)
HGB BLD-MCNC: 12.4 G/DL — SIGNIFICANT CHANGE UP (ref 11.5–15.5)
IANC: 4.07 K/UL — SIGNIFICANT CHANGE UP (ref 1.8–7.4)
IANC: 4.07 K/UL — SIGNIFICANT CHANGE UP (ref 1.8–7.4)
IMM GRANULOCYTES NFR BLD AUTO: 0.4 % — SIGNIFICANT CHANGE UP (ref 0–0.9)
IMM GRANULOCYTES NFR BLD AUTO: 0.4 % — SIGNIFICANT CHANGE UP (ref 0–0.9)
LYMPHOCYTES # BLD AUTO: 1.29 K/UL — SIGNIFICANT CHANGE UP (ref 1–3.3)
LYMPHOCYTES # BLD AUTO: 1.29 K/UL — SIGNIFICANT CHANGE UP (ref 1–3.3)
LYMPHOCYTES # BLD AUTO: 22.6 % — SIGNIFICANT CHANGE UP (ref 13–44)
LYMPHOCYTES # BLD AUTO: 22.6 % — SIGNIFICANT CHANGE UP (ref 13–44)
MAGNESIUM SERPL-MCNC: 2.6 MG/DL — SIGNIFICANT CHANGE UP (ref 1.6–2.6)
MAGNESIUM SERPL-MCNC: 2.6 MG/DL — SIGNIFICANT CHANGE UP (ref 1.6–2.6)
MCHC RBC-ENTMCNC: 35.8 PG — HIGH (ref 27–34)
MCHC RBC-ENTMCNC: 35.8 PG — HIGH (ref 27–34)
MCHC RBC-ENTMCNC: 36.6 GM/DL — HIGH (ref 32–36)
MCHC RBC-ENTMCNC: 36.6 GM/DL — HIGH (ref 32–36)
MCV RBC AUTO: 98 FL — SIGNIFICANT CHANGE UP (ref 80–100)
MCV RBC AUTO: 98 FL — SIGNIFICANT CHANGE UP (ref 80–100)
MONOCYTES # BLD AUTO: 0.31 K/UL — SIGNIFICANT CHANGE UP (ref 0–0.9)
MONOCYTES # BLD AUTO: 0.31 K/UL — SIGNIFICANT CHANGE UP (ref 0–0.9)
MONOCYTES NFR BLD AUTO: 5.4 % — SIGNIFICANT CHANGE UP (ref 2–14)
MONOCYTES NFR BLD AUTO: 5.4 % — SIGNIFICANT CHANGE UP (ref 2–14)
NEUTROPHILS # BLD AUTO: 4.07 K/UL — SIGNIFICANT CHANGE UP (ref 1.8–7.4)
NEUTROPHILS # BLD AUTO: 4.07 K/UL — SIGNIFICANT CHANGE UP (ref 1.8–7.4)
NEUTROPHILS NFR BLD AUTO: 71.4 % — SIGNIFICANT CHANGE UP (ref 43–77)
NEUTROPHILS NFR BLD AUTO: 71.4 % — SIGNIFICANT CHANGE UP (ref 43–77)
NRBC # BLD: 0 /100 WBCS — SIGNIFICANT CHANGE UP (ref 0–0)
NRBC # BLD: 0 /100 WBCS — SIGNIFICANT CHANGE UP (ref 0–0)
NRBC # FLD: 0 K/UL — SIGNIFICANT CHANGE UP (ref 0–0)
NRBC # FLD: 0 K/UL — SIGNIFICANT CHANGE UP (ref 0–0)
PHOSPHATE SERPL-MCNC: 5.3 MG/DL — HIGH (ref 2.5–4.5)
PHOSPHATE SERPL-MCNC: 5.3 MG/DL — HIGH (ref 2.5–4.5)
PLATELET # BLD AUTO: 146 K/UL — LOW (ref 150–400)
PLATELET # BLD AUTO: 146 K/UL — LOW (ref 150–400)
POTASSIUM SERPL-MCNC: 3.3 MMOL/L — LOW (ref 3.5–5.3)
POTASSIUM SERPL-MCNC: 3.3 MMOL/L — LOW (ref 3.5–5.3)
POTASSIUM SERPL-SCNC: 3.3 MMOL/L — LOW (ref 3.5–5.3)
POTASSIUM SERPL-SCNC: 3.3 MMOL/L — LOW (ref 3.5–5.3)
PROT ?TM UR-MCNC: 4 MG/DL — SIGNIFICANT CHANGE UP
PROT ?TM UR-MCNC: 4 MG/DL — SIGNIFICANT CHANGE UP
PROT SERPL-MCNC: 6 G/DL — SIGNIFICANT CHANGE UP (ref 6–8.3)
PROT SERPL-MCNC: 6 G/DL — SIGNIFICANT CHANGE UP (ref 6–8.3)
PROT/CREAT UR-RTO: 0.4 RATIO — HIGH (ref 0–0.2)
PROT/CREAT UR-RTO: 0.4 RATIO — HIGH (ref 0–0.2)
RBC # BLD: 3.46 M/UL — LOW (ref 3.8–5.2)
RBC # BLD: 3.46 M/UL — LOW (ref 3.8–5.2)
RBC # FLD: 12.2 % — SIGNIFICANT CHANGE UP (ref 10.3–14.5)
RBC # FLD: 12.2 % — SIGNIFICANT CHANGE UP (ref 10.3–14.5)
SODIUM SERPL-SCNC: 130 MMOL/L — LOW (ref 135–145)
SODIUM SERPL-SCNC: 130 MMOL/L — LOW (ref 135–145)
SODIUM UR-SCNC: 44 MMOL/L — SIGNIFICANT CHANGE UP
SODIUM UR-SCNC: 44 MMOL/L — SIGNIFICANT CHANGE UP
WBC # BLD: 5.7 K/UL — SIGNIFICANT CHANGE UP (ref 3.8–10.5)
WBC # BLD: 5.7 K/UL — SIGNIFICANT CHANGE UP (ref 3.8–10.5)
WBC # FLD AUTO: 5.7 K/UL — SIGNIFICANT CHANGE UP (ref 3.8–10.5)
WBC # FLD AUTO: 5.7 K/UL — SIGNIFICANT CHANGE UP (ref 3.8–10.5)

## 2023-10-17 PROCEDURE — 99233 SBSQ HOSP IP/OBS HIGH 50: CPT

## 2023-10-17 RX ORDER — BUMETANIDE 0.25 MG/ML
1 INJECTION INTRAMUSCULAR; INTRAVENOUS DAILY
Refills: 0 | Status: DISCONTINUED | OUTPATIENT
Start: 2023-10-18 | End: 2023-10-20

## 2023-10-17 RX ORDER — INFLUENZA VIRUS VACCINE 15; 15; 15; 15 UG/.5ML; UG/.5ML; UG/.5ML; UG/.5ML
0.7 SUSPENSION INTRAMUSCULAR ONCE
Refills: 0 | Status: DISCONTINUED | OUTPATIENT
Start: 2023-10-17 | End: 2023-10-20

## 2023-10-17 RX ORDER — LOSARTAN POTASSIUM 100 MG/1
25 TABLET, FILM COATED ORAL DAILY
Refills: 0 | Status: DISCONTINUED | OUTPATIENT
Start: 2023-10-17 | End: 2023-10-18

## 2023-10-17 RX ORDER — POTASSIUM CHLORIDE 20 MEQ
40 PACKET (EA) ORAL EVERY 4 HOURS
Refills: 0 | Status: COMPLETED | OUTPATIENT
Start: 2023-10-17 | End: 2023-10-17

## 2023-10-17 RX ADMIN — Medication 40 MILLIGRAM(S): at 05:45

## 2023-10-17 RX ADMIN — HEPARIN SODIUM 5000 UNIT(S): 5000 INJECTION INTRAVENOUS; SUBCUTANEOUS at 13:18

## 2023-10-17 RX ADMIN — Medication 25 MICROGRAM(S): at 05:45

## 2023-10-17 RX ADMIN — HEPARIN SODIUM 5000 UNIT(S): 5000 INJECTION INTRAVENOUS; SUBCUTANEOUS at 21:49

## 2023-10-17 RX ADMIN — Medication 40 MILLIEQUIVALENT(S): at 09:36

## 2023-10-17 RX ADMIN — Medication 40 MILLIEQUIVALENT(S): at 13:16

## 2023-10-17 RX ADMIN — TOLVAPTAN 15 MILLIGRAM(S): 15 TABLET ORAL at 00:41

## 2023-10-17 RX ADMIN — HEPARIN SODIUM 5000 UNIT(S): 5000 INJECTION INTRAVENOUS; SUBCUTANEOUS at 05:45

## 2023-10-17 RX ADMIN — POLYETHYLENE GLYCOL 3350 17 GRAM(S): 17 POWDER, FOR SOLUTION ORAL at 05:45

## 2023-10-17 NOTE — PROGRESS NOTE ADULT - PROBLEM SELECTOR PLAN 1
-follow results of JAVIER  -c/w tele, official cards eval called  -placed on 40 IV lasix,  follow Is/Os volume status much improved   -JAVIER severe MR, mitral prolapse   -s/p iv lasix 40 bid, transitioned to po bumex 1mg

## 2023-10-17 NOTE — PHYSICAL THERAPY INITIAL EVALUATION ADULT - PERTINENT HX OF CURRENT PROBLEM, REHAB EVAL
67 y.o. female h/o MV regurgitation, and hypothyroidism on synthroid;  presented for elective JAVIER due to MVR. The patient c/o profound SOB with exertion started in 5/2023, getting progressively worse as well as b/l lower extremity edema. Pt also s/p fall ~ 1 week ago states she tripped and fell and was checked out by Doctor afterwards with no injuries found. Pt underwent JAVIER but noted to be hyponatremic (125) and admitted

## 2023-10-17 NOTE — PHYSICAL THERAPY INITIAL EVALUATION ADULT - MANUAL MUSCLE TESTING RESULTS, REHAB EVAL
MMT at least 3- to 3/5 throughout; difficult to assess 2/2 pt appears to be self limiting, stating she is too weak but showed she was able to participate in sit>stand transfers and gait training activity

## 2023-10-17 NOTE — PROGRESS NOTE ADULT - SUBJECTIVE AND OBJECTIVE BOX
NEPHROLOGY     Patient seen and examined.    MEDICATIONS  (STANDING):  furosemide   Injectable 40 milliGRAM(s) IV Push two times a day  heparin   Injectable 5000 Unit(s) SubCutaneous every 8 hours  influenza  Vaccine (HIGH DOSE) 0.7 milliLiter(s) IntraMuscular once  levothyroxine 25 MICROGram(s) Oral daily  potassium chloride    Tablet ER 40 milliEquivalent(s) Oral every 4 hours    VITALS:  T(C): , Max: 36.7 (10-16-23 @ 19:40)  T(F): , Max: 98 (10-16-23 @ 19:40)  HR: 63 (10-17-23 @ 05:40)  BP: 100/56 (10-17-23 @ 05:50)  BP(mean): --  RR: 19 (10-17-23 @ 05:40)  SpO2: 100% (10-17-23 @ 05:40)    PHYSICAL EXAM:  Constitutional: NAD  HEENT: EOMI  Neck:  No JVD, supple   Respiratory: CTA B/L  Cardiovascular: S1 and S2, RRR  Gastrointestinal: + BS, soft, NT, ND  Extremities: +  peripheral edema, + peripheral pulses  Neurological: A/O x 3, CN2-12 intact  Psychiatric: Normal mood, normal affect  : No Dinero  Skin: No rashes, C/D/I    LABS:                        12.4   5.70  )-----------( 146      ( 17 Oct 2023 06:19 )             33.9     10-17    130<L>  |  88<L>  |  40<H>  ----------------------------<  90  3.3<L>   |  27  |  0.48<L>    Ca    8.3<L>      17 Oct 2023 06:19  Phos  4.5     10-16  Mg     2.60     10-16    TPro  6.0  /  Alb  3.7  /  TBili  0.6  /  DBili  x   /  AST  69<H>  /  ALT  39<H>  /  AlkPhos  57  10-17      Urine Studies    Sodium, Random Urine: 44 mmol/L (10-16 @ 23:22)  Creatinine, Random Urine: 10 mg/dL (10-16 @ 23:22)  Protein/Creatinine Ratio Calculation: 0.4 Ratio (10-16 @ 23:22)   NEPHROLOGY     Patient seen and examined with family at bedside, reports feeling ok, no new complaints, states the le edema have improved, denies pain, no sob, comfortable on room air, in no acute distress.     MEDICATIONS  (STANDING):  furosemide   Injectable 40 milliGRAM(s) IV Push two times a day  heparin   Injectable 5000 Unit(s) SubCutaneous every 8 hours  influenza  Vaccine (HIGH DOSE) 0.7 milliLiter(s) IntraMuscular once  levothyroxine 25 MICROGram(s) Oral daily  potassium chloride    Tablet ER 40 milliEquivalent(s) Oral every 4 hours    VITALS:  T(C): , Max: 36.7 (10-16-23 @ 19:40)  T(F): , Max: 98 (10-16-23 @ 19:40)  HR: 63 (10-17-23 @ 05:40)  BP: 100/56 (10-17-23 @ 05:50)  BP(mean): --  RR: 19 (10-17-23 @ 05:40)  SpO2: 100% (10-17-23 @ 05:40)    PHYSICAL EXAM:  Constitutional: NAD  HEENT: EOMI  Neck:  No JVD, supple   Respiratory: CTA B/L  Cardiovascular: S1 and S2, RRR  Gastrointestinal: + BS, soft, NT, ND  Extremities: +  peripheral edema, + peripheral pulses  Neurological: A/O x 3, CN2-12 intact  Psychiatric: Normal mood, normal affect  : No Dinero  Skin: No rashes, C/D/I    LABS:                        12.4   5.70  )-----------( 146      ( 17 Oct 2023 06:19 )             33.9     10-17    130<L>  |  88<L>  |  40<H>  ----------------------------<  90  3.3<L>   |  27  |  0.48<L>    Ca    8.3<L>      17 Oct 2023 06:19  Phos  4.5     10-16  Mg     2.60     10-16    TPro  6.0  /  Alb  3.7  /  TBili  0.6  /  DBili  x   /  AST  69<H>  /  ALT  39<H>  /  AlkPhos  57  10-17      Urine Studies    Sodium, Random Urine: 44 mmol/L (10-16 @ 23:22)  Creatinine, Random Urine: 10 mg/dL (10-16 @ 23:22)  Protein/Creatinine Ratio Calculation: 0.4 Ratio (10-16 @ 23:22)    ASSESSMENT/PLAN:   67 y.o. female presents today for elective JAVIER due to MVR.  Hypervolemic hyponatremia   Hypokalemia   Elevated serum HCO3  Failure to thrive     1 Renal- on Lasix 40mg IVP bid, can change to bumex 1 mg po qd  A/w KCl 40 mEq po x 2 doses   This is not SIADH.  sofia dumont will not help   Check TSH please  s/p Samsca 15mg po x 1 yesterday for the hyponatremia   2 GI-Miralax for the constipation   She has refused protein shakes  Now on regular diet without restriction as protein intake must be uninterrupted   3 CVS-Structural heart disease to be made aware of the JAVIER   Start Cozaar 25mg po qd for afterload reduction     D/c planning    NEPHROLOGY     Patient seen and examined with family at bedside, reports feeling ok, no new complaints, states the le edema have improved, denies pain, no sob, comfortable on room air, in no acute distress.     MEDICATIONS  (STANDING):  furosemide   Injectable 40 milliGRAM(s) IV Push two times a day  heparin   Injectable 5000 Unit(s) SubCutaneous every 8 hours  influenza  Vaccine (HIGH DOSE) 0.7 milliLiter(s) IntraMuscular once  levothyroxine 25 MICROGram(s) Oral daily  potassium chloride    Tablet ER 40 milliEquivalent(s) Oral every 4 hours    VITALS:  T(C): , Max: 36.7 (10-16-23 @ 19:40)  T(F): , Max: 98 (10-16-23 @ 19:40)  HR: 63 (10-17-23 @ 05:40)  BP: 100/56 (10-17-23 @ 05:50)  BP(mean): --  RR: 19 (10-17-23 @ 05:40)  SpO2: 100% (10-17-23 @ 05:40)    PHYSICAL EXAM:  Constitutional: NAD  HEENT: EOMI  Neck:  No JVD, supple   Respiratory: CTA B/L  Cardiovascular: S1 and S2, RRR  Gastrointestinal: + BS, soft, NT, ND  Extremities: +  peripheral edema, + peripheral pulses  Neurological: A/O x 3, CN2-12 intact  Psychiatric: Normal mood, normal affect  : No Dinero  Skin: No rashes, C/D/I    LABS:                        12.4   5.70  )-----------( 146      ( 17 Oct 2023 06:19 )             33.9     10-17    130<L>  |  88<L>  |  40<H>  ----------------------------<  90  3.3<L>   |  27  |  0.48<L>    Ca    8.3<L>      17 Oct 2023 06:19  Phos  4.5     10-16  Mg     2.60     10-16    TPro  6.0  /  Alb  3.7  /  TBili  0.6  /  DBili  x   /  AST  69<H>  /  ALT  39<H>  /  AlkPhos  57  10-17      Urine Studies    Sodium, Random Urine: 44 mmol/L (10-16 @ 23:22)  Creatinine, Random Urine: 10 mg/dL (10-16 @ 23:22)  Protein/Creatinine Ratio Calculation: 0.4 Ratio (10-16 @ 23:22)

## 2023-10-17 NOTE — ADVANCED PRACTICE NURSE CONSULT - RECOMMEDATIONS
Topical recommendations:     Sacrum- Continue to apply silicone foam with border for prevention. Change every 2-3 days. Once foam in place, Apply Raina moisture barrier cream twice a day or PRN with episodes of incontinence.     Continue low air loss bed therapy,  heel elevation, turn & reposition q2h with Z-flow fluidized pillow, continue moisture management with barrier creams as specified above & single breathable pad, continue measures to decrease friction/shear.   Plan discussed with patient and  at bedside- educated on topical wound therapy to optimize wound healing. Questions answered.     Findings and recs discussed with [facility] provider.     Please contact Wound/Ostomy Care Service Line if we can be of further assistance (ext [phone]).

## 2023-10-17 NOTE — PROGRESS NOTE ADULT - PROBLEM SELECTOR PLAN 2
possibly stemming from fluid overloaded state; however, hypovolemic insult possible  as pt reports significantly increased UO since taking lasix 4 days ago  -q6h bmp, uric acid, UA, urine Na ordered  -seen by renal, placed on one time dose of adh antagonist  -q 6h neuro check likely hypervolemic hyponatremia. immproving   -s/p tolvaptan and iv lasix  -c/w po bumex 1mg as per nephro  -trend Na daily

## 2023-10-17 NOTE — ADVANCED PRACTICE NURSE CONSULT - ASSESSMENT
General: A&Ox4, turns with one assist, continent of urine and stool. Skin warm, dry with poor skin turgor, ecchymosis  to right side of face. Cool bilateral lower legs, mild swelling- greatly improved as per patient and nephrologist at bedside. Blanching erythema on bilateral heels.  General: A&Ox4, turns with two assist,  at bedside assisting with turn, continent of urine. Patient thin with muscle wasting noted. Skin warm, dry with poor skin turgor, thin fragile, ecchymosis  to right side of face and left knee. Cool bilateral lower legs, no ischemic changes, mild swelling- greatly improved as per patient and nephrologist at bedside. Blanching erythema on bilateral heels.     Sacrum- prominent protruding sacral bone with blanching erythema, no open areas noted. Protective foam reapplied.      Risk for IAD- skin intact at this time     Patient continues to be at high risk for skin breakdown. On assessment patient on a low air loss surface, heels offloaded, positioning pillow utilized, moisture management in place. Turning and positioning Q2hrs.

## 2023-10-17 NOTE — PROGRESS NOTE ADULT - SUBJECTIVE AND OBJECTIVE BOX
ANESTHESIA POSTOP CHECK    67y Female POSTOP DAY 1 S/P JAVIER    Vital Signs Last 24 Hrs  T(C): 36.6 (17 Oct 2023 05:40), Max: 36.7 (16 Oct 2023 19:40)  T(F): 97.9 (17 Oct 2023 05:40), Max: 98 (16 Oct 2023 19:40)  HR: 63 (17 Oct 2023 05:40) (60 - 63)  BP: 100/56 (17 Oct 2023 05:50) (99/60 - 106/58)  BP(mean): --  RR: 19 (17 Oct 2023 05:40) (16 - 19)  SpO2: 100% (17 Oct 2023 05:40) (99% - 100%)    Parameters below as of 17 Oct 2023 05:40  Patient On (Oxygen Delivery Method): room air      I&O's Summary    16 Oct 2023 07:01  -  17 Oct 2023 07:00  --------------------------------------------------------  IN: 200 mL / OUT: 0 mL / NET: 200 mL        [X] NO APPARENT ANESTHESIA COMPLICATIONS      Comments:

## 2023-10-17 NOTE — PROGRESS NOTE ADULT - SUBJECTIVE AND OBJECTIVE BOX
[facility] Division of Hospital Medicine  Larry Dale MD  Pager [phone]      Patient is a 67y old  Female who presents with a chief complaint of hyponatremia (17 Oct 2023 10:21)      SUBJECTIVE / OVERNIGHT EVENTS:    no acute event o/n. reports her sob and leg swelling has much improved. still has generalized weakness    ADDITIONAL REVIEW OF SYSTEMS:    RESPIRATORY: No cough, wheezing, chills or hemoptysis; No shortness of breath  CARDIOVASCULAR: No chest pain, palpitations, dizziness, or leg swelling  GASTROINTESTINAL: No abdominal or epigastric pain. No nausea, vomiting, or hematemesis; No diarrhea or constipation. No melena or hematochezia.      MEDICATIONS  (STANDING):  heparin   Injectable 5000 Unit(s) SubCutaneous every 8 hours  influenza  Vaccine (HIGH DOSE) 0.7 milliLiter(s) IntraMuscular once  levothyroxine 25 MICROGram(s) Oral daily  losartan 25 milliGRAM(s) Oral daily    MEDICATIONS  (PRN):      CAPILLARY BLOOD GLUCOSE        I&O's Summary    16 Oct 2023 07:01  -  17 Oct 2023 07:00  --------------------------------------------------------  IN: 200 mL / OUT: 0 mL / NET: 200 mL        PHYSICAL EXAM:  Vital Signs Last 24 Hrs  T(C): 36.6 (17 Oct 2023 13:14), Max: 36.7 (16 Oct 2023 19:40)  T(F): 97.8 (17 Oct 2023 13:14), Max: 98 (16 Oct 2023 19:40)  HR: 64 (17 Oct 2023 13:14) (60 - 64)  BP: 101/60 (17 Oct 2023 13:14) (99/60 - 106/58)  BP(mean): --  RR: 19 (17 Oct 2023 13:14) (16 - 19)  SpO2: 100% (17 Oct 2023 13:14) (99% - 100%)    Parameters below as of 17 Oct 2023 13:14  Patient On (Oxygen Delivery Method): room air        CONSTITUTIONAL: NAD,  EYES: PERRLA; conjunctiva and sclera clear  ENMT: Moist oral mucosa, no pharyngeal injection or exudates;   NECK: Supple, no palpable masses;  RESPIRATORY: Normal respiratory effort; lungs are clear to auscultation bilaterally  CARDIOVASCULAR: Regular rate and rhythm, normal S1 and S2, no murmur/rub/gallop; trace lower extremity edema; Peripheral pulses are 2+ bilaterally  ABDOMEN: Nontender to palpation, normoactive bowel sounds, no rebound/guarding;   MUSCLOSKELETAL:   no clubbing or cyanosis of digits; no joint swelling or tenderness to palpation  PSYCH: A+O to person, place, and time; affect appropriate  NEUROLOGY: CN 2-12 are intact and symmetric; no gross sensory deficits;   SKIN: bruising on face  b/l LE venous stasis changes     LABS:                        12.4   5.70  )-----------( 146      ( 17 Oct 2023 06:19 )             33.9     10-17    130<L>  |  88<L>  |  40<H>  ----------------------------<  90  3.3<L>   |  27  |  0.48<L>    Ca    8.3<L>      17 Oct 2023 06:19  Phos  5.3     10-17  Mg     2.60     10-17    TPro  6.0  /  Alb  3.7  /  TBili  0.6  /  DBili  x   /  AST  69<H>  /  ALT  39<H>  /  AlkPhos  57  10-17    PT/INR - ( 16 Oct 2023 22:16 )   PT: 15.2 sec;   INR: 1.37 ratio         PTT - ( 16 Oct 2023 22:16 )  PTT:21.6 sec      Urinalysis Basic - ( 17 Oct 2023 06:19 )    Color: x / Appearance: x / SG: x / pH: x  Gluc: 90 mg/dL / Ketone: x  / Bili: x / Urobili: x   Blood: x / Protein: x / Nitrite: x   Leuk Esterase: x / RBC: x / WBC x   Sq Epi: x / Non Sq Epi: x / Bacteria: x          RADIOLOGY & ADDITIONAL TESTS:  Results Reviewed:   Imaging Personally Reviewed:  Electrocardiogram Personally Reviewed:    COORDINATION OF CARE:  Care Discussed with Consultants/Other Providers [Y/N]:  Prior or Outpatient Records Reviewed [Y/N]:

## 2023-10-17 NOTE — ADVANCED PRACTICE NURSE CONSULT - REASON FOR CONSULT
Patient seen on skin care rounds after wound care referral received for assessment of skin impairment and recommendations of topical management. Patient H/O of V regurg, and hypothyroidism on synthroid;  presented today for elective JAVIER due to MVR. The patient is s/p fall on her face, 1 week ago, as she tripped in her house. As per patient, she had full evaluation by her PMD, no fractures noted. Reports walker dependence over last month as she notes difficulty lifting her legs to walk properly. Pt underwent JAVIER but noted to be hyponatremic (125) and admitted.  Chart reviewed: WBC 5.70, H/H 12.4/33.9, platelets 146, INR 1.37, Serum albumin 3.7, Shreyas 16.

## 2023-10-17 NOTE — PHYSICAL THERAPY INITIAL EVALUATION ADULT - GAIT DISTANCE, PT EVAL
4 lateral steps -pt deferred further ambulation 2/2 fatigue and thinks she will become short of breath; pt then requesting to use bedpan

## 2023-10-17 NOTE — PHYSICAL THERAPY INITIAL EVALUATION ADULT - NSPTDISCHREC_GEN_A_CORE
[facility]; if pt declines pt would benefit from home PT to improve strength, transfers, ambulation, endurance

## 2023-10-17 NOTE — PROGRESS NOTE ADULT - ASSESSMENT
67 y.o. female presents today for elective JAVIER due to MVR.  Hypervolemic hyponatremia   Hypokalemia   Failure to thrive     1 Renal- on Lasix 40mg IVP bid, can change to bumex 1 mg po qd  A/w KCl 40 mEq po x 2 doses   This is not SIADH.     2 GI-Miralax for the constipation   She has refused protein shakes  Now on regular diet without restriction as protein intake must be uninterrupted   3 CVS-Structural heart disease to be made aware of the JAVIER   Start Cozaar 25mg po qd for afterload reduction     D/c planning     Sayed [facility]   [phone]    67 y.o. female presents today for elective JAVIER due to MVR.  Hypervolemic hyponatremia   Hypokalemia   Failure to thrive     1 Renal- on Lasix 40mg IVP bid, can change to bumex 1 mg po qd  A/w KCl 40 mEq po x 2 doses   This is not SIADH.     2 GI-Miralax for the constipation;  She needs nutritional support   She has refused protein shakes  Now on regular diet without restriction as protein intake must be uninterrupted   3 CVS-Structural heart disease to be made aware of the JAVIER ( i reached out to Dr Cate almanzar)   Start Cozaar 25mg po qd for afterload reduction     D/c planning   DW    Sayed Ali   [facility]   [phone]

## 2023-10-18 LAB
ALBUMIN SERPL ELPH-MCNC: 3.8 G/DL — SIGNIFICANT CHANGE UP (ref 3.3–5)
ALBUMIN SERPL ELPH-MCNC: 3.8 G/DL — SIGNIFICANT CHANGE UP (ref 3.3–5)
ALP SERPL-CCNC: 55 U/L — SIGNIFICANT CHANGE UP (ref 40–120)
ALP SERPL-CCNC: 55 U/L — SIGNIFICANT CHANGE UP (ref 40–120)
ALT FLD-CCNC: 39 U/L — HIGH (ref 4–33)
ALT FLD-CCNC: 39 U/L — HIGH (ref 4–33)
ANION GAP SERPL CALC-SCNC: 6 MMOL/L — LOW (ref 7–14)
ANION GAP SERPL CALC-SCNC: 6 MMOL/L — LOW (ref 7–14)
ANION GAP SERPL CALC-SCNC: 9 MMOL/L — SIGNIFICANT CHANGE UP (ref 7–14)
ANION GAP SERPL CALC-SCNC: 9 MMOL/L — SIGNIFICANT CHANGE UP (ref 7–14)
AST SERPL-CCNC: 68 U/L — HIGH (ref 4–32)
AST SERPL-CCNC: 68 U/L — HIGH (ref 4–32)
BILIRUB SERPL-MCNC: 0.5 MG/DL — SIGNIFICANT CHANGE UP (ref 0.2–1.2)
BILIRUB SERPL-MCNC: 0.5 MG/DL — SIGNIFICANT CHANGE UP (ref 0.2–1.2)
BUN SERPL-MCNC: 33 MG/DL — HIGH (ref 7–23)
BUN SERPL-MCNC: 33 MG/DL — HIGH (ref 7–23)
BUN SERPL-MCNC: 35 MG/DL — HIGH (ref 7–23)
BUN SERPL-MCNC: 35 MG/DL — HIGH (ref 7–23)
CALCIUM SERPL-MCNC: 8.2 MG/DL — LOW (ref 8.4–10.5)
CALCIUM SERPL-MCNC: 8.2 MG/DL — LOW (ref 8.4–10.5)
CALCIUM SERPL-MCNC: 8.7 MG/DL — SIGNIFICANT CHANGE UP (ref 8.4–10.5)
CALCIUM SERPL-MCNC: 8.7 MG/DL — SIGNIFICANT CHANGE UP (ref 8.4–10.5)
CHLORIDE SERPL-SCNC: 88 MMOL/L — LOW (ref 98–107)
CHLORIDE SERPL-SCNC: 88 MMOL/L — LOW (ref 98–107)
CHLORIDE SERPL-SCNC: 93 MMOL/L — LOW (ref 98–107)
CHLORIDE SERPL-SCNC: 93 MMOL/L — LOW (ref 98–107)
CO2 SERPL-SCNC: 28 MMOL/L — SIGNIFICANT CHANGE UP (ref 22–31)
CO2 SERPL-SCNC: 28 MMOL/L — SIGNIFICANT CHANGE UP (ref 22–31)
CO2 SERPL-SCNC: 30 MMOL/L — SIGNIFICANT CHANGE UP (ref 22–31)
CO2 SERPL-SCNC: 30 MMOL/L — SIGNIFICANT CHANGE UP (ref 22–31)
CREAT SERPL-MCNC: 0.42 MG/DL — LOW (ref 0.5–1.3)
CREAT SERPL-MCNC: 0.42 MG/DL — LOW (ref 0.5–1.3)
CREAT SERPL-MCNC: 0.48 MG/DL — LOW (ref 0.5–1.3)
CREAT SERPL-MCNC: 0.48 MG/DL — LOW (ref 0.5–1.3)
EGFR: 103 ML/MIN/1.73M2 — SIGNIFICANT CHANGE UP
EGFR: 103 ML/MIN/1.73M2 — SIGNIFICANT CHANGE UP
EGFR: 106 ML/MIN/1.73M2 — SIGNIFICANT CHANGE UP
EGFR: 106 ML/MIN/1.73M2 — SIGNIFICANT CHANGE UP
GLUCOSE SERPL-MCNC: 85 MG/DL — SIGNIFICANT CHANGE UP (ref 70–99)
GLUCOSE SERPL-MCNC: 85 MG/DL — SIGNIFICANT CHANGE UP (ref 70–99)
GLUCOSE SERPL-MCNC: 94 MG/DL — SIGNIFICANT CHANGE UP (ref 70–99)
GLUCOSE SERPL-MCNC: 94 MG/DL — SIGNIFICANT CHANGE UP (ref 70–99)
HCT VFR BLD CALC: 31.5 % — LOW (ref 34.5–45)
HCT VFR BLD CALC: 31.5 % — LOW (ref 34.5–45)
HGB BLD-MCNC: 11.4 G/DL — LOW (ref 11.5–15.5)
HGB BLD-MCNC: 11.4 G/DL — LOW (ref 11.5–15.5)
MAGNESIUM SERPL-MCNC: 2.3 MG/DL — SIGNIFICANT CHANGE UP (ref 1.6–2.6)
MAGNESIUM SERPL-MCNC: 2.3 MG/DL — SIGNIFICANT CHANGE UP (ref 1.6–2.6)
MAGNESIUM SERPL-MCNC: 2.6 MG/DL — SIGNIFICANT CHANGE UP (ref 1.6–2.6)
MAGNESIUM SERPL-MCNC: 2.6 MG/DL — SIGNIFICANT CHANGE UP (ref 1.6–2.6)
MCHC RBC-ENTMCNC: 35.6 PG — HIGH (ref 27–34)
MCHC RBC-ENTMCNC: 35.6 PG — HIGH (ref 27–34)
MCHC RBC-ENTMCNC: 36.2 GM/DL — HIGH (ref 32–36)
MCHC RBC-ENTMCNC: 36.2 GM/DL — HIGH (ref 32–36)
MCV RBC AUTO: 98.4 FL — SIGNIFICANT CHANGE UP (ref 80–100)
MCV RBC AUTO: 98.4 FL — SIGNIFICANT CHANGE UP (ref 80–100)
NRBC # BLD: 0 /100 WBCS — SIGNIFICANT CHANGE UP (ref 0–0)
NRBC # BLD: 0 /100 WBCS — SIGNIFICANT CHANGE UP (ref 0–0)
NRBC # FLD: 0 K/UL — SIGNIFICANT CHANGE UP (ref 0–0)
NRBC # FLD: 0 K/UL — SIGNIFICANT CHANGE UP (ref 0–0)
PHOSPHATE SERPL-MCNC: 2.8 MG/DL — SIGNIFICANT CHANGE UP (ref 2.5–4.5)
PHOSPHATE SERPL-MCNC: 2.8 MG/DL — SIGNIFICANT CHANGE UP (ref 2.5–4.5)
PHOSPHATE SERPL-MCNC: 2.9 MG/DL — SIGNIFICANT CHANGE UP (ref 2.5–4.5)
PHOSPHATE SERPL-MCNC: 2.9 MG/DL — SIGNIFICANT CHANGE UP (ref 2.5–4.5)
PLATELET # BLD AUTO: 142 K/UL — LOW (ref 150–400)
PLATELET # BLD AUTO: 142 K/UL — LOW (ref 150–400)
POTASSIUM SERPL-MCNC: 3.7 MMOL/L — SIGNIFICANT CHANGE UP (ref 3.5–5.3)
POTASSIUM SERPL-MCNC: 3.7 MMOL/L — SIGNIFICANT CHANGE UP (ref 3.5–5.3)
POTASSIUM SERPL-MCNC: 5.6 MMOL/L — HIGH (ref 3.5–5.3)
POTASSIUM SERPL-MCNC: 5.6 MMOL/L — HIGH (ref 3.5–5.3)
POTASSIUM SERPL-SCNC: 3.7 MMOL/L — SIGNIFICANT CHANGE UP (ref 3.5–5.3)
POTASSIUM SERPL-SCNC: 3.7 MMOL/L — SIGNIFICANT CHANGE UP (ref 3.5–5.3)
POTASSIUM SERPL-SCNC: 5.6 MMOL/L — HIGH (ref 3.5–5.3)
POTASSIUM SERPL-SCNC: 5.6 MMOL/L — HIGH (ref 3.5–5.3)
PROT SERPL-MCNC: 5.7 G/DL — LOW (ref 6–8.3)
PROT SERPL-MCNC: 5.7 G/DL — LOW (ref 6–8.3)
RBC # BLD: 3.2 M/UL — LOW (ref 3.8–5.2)
RBC # BLD: 3.2 M/UL — LOW (ref 3.8–5.2)
RBC # FLD: 12.4 % — SIGNIFICANT CHANGE UP (ref 10.3–14.5)
RBC # FLD: 12.4 % — SIGNIFICANT CHANGE UP (ref 10.3–14.5)
SODIUM SERPL-SCNC: 127 MMOL/L — LOW (ref 135–145)
WBC # BLD: 4.3 K/UL — SIGNIFICANT CHANGE UP (ref 3.8–10.5)
WBC # BLD: 4.3 K/UL — SIGNIFICANT CHANGE UP (ref 3.8–10.5)
WBC # FLD AUTO: 4.3 K/UL — SIGNIFICANT CHANGE UP (ref 3.8–10.5)
WBC # FLD AUTO: 4.3 K/UL — SIGNIFICANT CHANGE UP (ref 3.8–10.5)

## 2023-10-18 PROCEDURE — 99233 SBSQ HOSP IP/OBS HIGH 50: CPT

## 2023-10-18 RX ORDER — SODIUM ZIRCONIUM CYCLOSILICATE 10 G/10G
10 POWDER, FOR SUSPENSION ORAL ONCE
Refills: 0 | Status: DISCONTINUED | OUTPATIENT
Start: 2023-10-18 | End: 2023-10-18

## 2023-10-18 RX ORDER — POLYETHYLENE GLYCOL 3350 17 G/17G
17 POWDER, FOR SOLUTION ORAL DAILY
Refills: 0 | Status: DISCONTINUED | OUTPATIENT
Start: 2023-10-18 | End: 2023-10-19

## 2023-10-18 RX ORDER — SODIUM ZIRCONIUM CYCLOSILICATE 10 G/10G
5 POWDER, FOR SUSPENSION ORAL ONCE
Refills: 0 | Status: COMPLETED | OUTPATIENT
Start: 2023-10-18 | End: 2023-10-18

## 2023-10-18 RX ADMIN — Medication 25 MICROGRAM(S): at 06:45

## 2023-10-18 RX ADMIN — SODIUM ZIRCONIUM CYCLOSILICATE 5 GRAM(S): 10 POWDER, FOR SUSPENSION ORAL at 11:09

## 2023-10-18 RX ADMIN — BUMETANIDE 1 MILLIGRAM(S): 0.25 INJECTION INTRAMUSCULAR; INTRAVENOUS at 06:45

## 2023-10-18 RX ADMIN — HEPARIN SODIUM 5000 UNIT(S): 5000 INJECTION INTRAVENOUS; SUBCUTANEOUS at 06:45

## 2023-10-18 RX ADMIN — HEPARIN SODIUM 5000 UNIT(S): 5000 INJECTION INTRAVENOUS; SUBCUTANEOUS at 15:41

## 2023-10-18 RX ADMIN — Medication 5 MILLIGRAM(S): at 22:38

## 2023-10-18 RX ADMIN — POLYETHYLENE GLYCOL 3350 17 GRAM(S): 17 POWDER, FOR SOLUTION ORAL at 09:21

## 2023-10-18 RX ADMIN — LOSARTAN POTASSIUM 25 MILLIGRAM(S): 100 TABLET, FILM COATED ORAL at 06:51

## 2023-10-18 RX ADMIN — HEPARIN SODIUM 5000 UNIT(S): 5000 INJECTION INTRAVENOUS; SUBCUTANEOUS at 22:39

## 2023-10-18 NOTE — PROGRESS NOTE ADULT - ASSESSMENT
ASSESSMENT/PLAN:   67 y.o. female presents today for elective JAVIER due to MVR.  Hypervolemic hyponatremia   Hypokalemia - now higher s/p repletion yesterday   Failure to thrive     1 Renal- s/p Lasix 40mg IVP bid, now on bumex 1 mg po qd, no objection to lasix 30 mg po qd  K+ higher s/p KCL 40 mEq po x 2 doses yesterday - s/p Lokelma 5g po x 1 this a.m. Repeat BMP this afternoon.   Na + slightly lower.  This is not SIADH.     2 GI-Miralax for the constipation;  She needs nutritional support   She has refused protein shakes  Now on regular diet without restriction as protein intake must be uninterrupted   3 CVS-Structural heart disease to be made aware of the JAVIER ( i reached out to Dr Cate almanzar)   Continue Cozaar 25mg po qd for afterload reduction     D/w Dr. Funk, Dr. Dale and Dr. Jamel De Souza, [facility]  [phone]

## 2023-10-18 NOTE — PROGRESS NOTE ADULT - PROBLEM SELECTOR PLAN 2
likely hypervolemic hyponatremia. improving   -s/p tolvaptan and iv lasix  -c/w po bumex 1mg as per nephro  -Na stable @127 today. f/u nephro for further recs

## 2023-10-18 NOTE — PROGRESS NOTE ADULT - SUBJECTIVE AND OBJECTIVE BOX
[facility] Division of Hospital Medicine  Larry Dale MD  Pager [phone]      Patient is a 67y old  Female who presents with a chief complaint of hyponatremia (18 Oct 2023 12:17)      SUBJECTIVE / OVERNIGHT EVENTS:    no acute event o/n.     ADDITIONAL REVIEW OF SYSTEMS:    RESPIRATORY: No cough, wheezing, chills or hemoptysis; No shortness of breath  CARDIOVASCULAR: No chest pain, palpitations, dizziness, or leg swelling  GASTROINTESTINAL: No abdominal or epigastric pain. No nausea, vomiting, or hematemesis; No diarrhea or constipation. No melena or hematochezia.      MEDICATIONS  (STANDING):  buMETAnide 1 milliGRAM(s) Oral daily  heparin   Injectable 5000 Unit(s) SubCutaneous every 8 hours  influenza  Vaccine (HIGH DOSE) 0.7 milliLiter(s) IntraMuscular once  levothyroxine 25 MICROGram(s) Oral daily  polyethylene glycol 3350 17 Gram(s) Oral daily    MEDICATIONS  (PRN):      CAPILLARY BLOOD GLUCOSE        I&O's Summary      PHYSICAL EXAM:  Vital Signs Last 24 Hrs  T(C): 36.3 (18 Oct 2023 06:45), Max: 36.3 (18 Oct 2023 06:45)  T(F): 97.3 (18 Oct 2023 06:45), Max: 97.3 (18 Oct 2023 06:45)  HR: 67 (18 Oct 2023 13:47) (67 - 68)  BP: 105/63 (18 Oct 2023 13:47) (105/63 - 111/82)  BP(mean): --  RR: 18 (18 Oct 2023 13:47) (18 - 18)  SpO2: 100% (18 Oct 2023 13:47) (100% - 100%)    Parameters below as of 18 Oct 2023 13:47  Patient On (Oxygen Delivery Method): room air        CONSTITUTIONAL: NAD,  EYES: PERRLA; conjunctiva and sclera clear  ENMT: Moist oral mucosa, no pharyngeal injection or exudates;   NECK: Supple, no palpable masses;  RESPIRATORY: Normal respiratory effort; lungs are clear to auscultation bilaterally  CARDIOVASCULAR: Regular rate and rhythm, normal S1 and S2, no murmur/rub/gallop; trace lower extremity edema; Peripheral pulses are 2+ bilaterally  ABDOMEN: Nontender to palpation, normoactive bowel sounds, no rebound/guarding;   MUSCLOSKELETAL:   no clubbing or cyanosis of digits; no joint swelling or tenderness to palpation  PSYCH: A+O to person, place, and time; affect appropriate  NEUROLOGY: CN 2-12 are intact and symmetric; no gross sensory deficits;   SKIN: bruising on face  b/l LE venous stasis changes     LABS:                        11.4   4.30  )-----------( 142      ( 18 Oct 2023 07:00 )             31.5     10-18    127<L>  |  88<L>  |  33<H>  ----------------------------<  85  3.7   |  30  |  0.42<L>    Ca    8.2<L>      18 Oct 2023 14:20  Phos  2.8     10-18  Mg     2.30     10-18    TPro  5.7<L>  /  Alb  3.8  /  TBili  0.5  /  DBili  x   /  AST  68<H>  /  ALT  39<H>  /  AlkPhos  55  10-18    PT/INR - ( 16 Oct 2023 22:16 )   PT: 15.2 sec;   INR: 1.37 ratio         PTT - ( 16 Oct 2023 22:16 )  PTT:21.6 sec      Urinalysis Basic - ( 18 Oct 2023 14:20 )    Color: x / Appearance: x / SG: x / pH: x  Gluc: 85 mg/dL / Ketone: x  / Bili: x / Urobili: x   Blood: x / Protein: x / Nitrite: x   Leuk Esterase: x / RBC: x / WBC x   Sq Epi: x / Non Sq Epi: x / Bacteria: x          RADIOLOGY & ADDITIONAL TESTS:  Results Reviewed:   Imaging Personally Reviewed:  Electrocardiogram Personally Reviewed:    COORDINATION OF CARE:  Care Discussed with Consultants/Other Providers [Y/N]:  Prior or Outpatient Records Reviewed [Y/N]:

## 2023-10-18 NOTE — PROGRESS NOTE ADULT - SUBJECTIVE AND OBJECTIVE BOX
NEPHROLOGY     Patient seen and examined with family at bedside, no new complaints, though expresses frustration with hospital course, she denies pain, no sob, comfortable on room air, in no acute distress.     MEDICATIONS  (STANDING):  buMETAnide 1 milliGRAM(s) Oral daily  heparin   Injectable 5000 Unit(s) SubCutaneous every 8 hours  influenza  Vaccine (HIGH DOSE) 0.7 milliLiter(s) IntraMuscular once  levothyroxine 25 MICROGram(s) Oral daily  polyethylene glycol 3350 17 Gram(s) Oral daily    VITALS:  T(C): , Max: 36.6 (10-17-23 @ 13:14)  T(F): , Max: 97.8 (10-17-23 @ 13:14)  HR: 68 (10-18-23 @ 06:45)  BP: 111/82 (10-18-23 @ 06:45)  RR: 18 (10-18-23 @ 06:45)  SpO2: 100% (10-18-23 @ 06:45)    PHYSICAL EXAM:  Constitutional: NAD  HEENT: EOMI  Neck:  No JVD, supple   Respiratory: CTA B/L  Cardiovascular: S1 and S2, RRR  Gastrointestinal: + BS, soft, NT, ND  Extremities: +  peripheral edema, + peripheral pulses  Neurological: A/O x 3, CN2-12 intact  Psychiatric: Normal mood, normal affect  : No Dinero  Skin: No rashes, C/D/I    LABS:                        11.4   4.30  )-----------( 142      ( 18 Oct 2023 07:00 )             31.5     10-18    127<L>  |  93<L>  |  35<H>  ----------------------------<  94  5.6<H>   |  28  |  0.48<L>    Ca    8.7      18 Oct 2023 07:00  Phos  2.9     10-18  Mg     2.60     10-18    TPro  5.7<L>  /  Alb  3.8  /  TBili  0.5  /  DBili  x   /  AST  68<H>  /  ALT  39<H>  /  AlkPhos  55  10-18    Urine Studies:    Sodium, Random Urine: 44 mmol/L (10-16 @ 23:22)  Creatinine, Random Urine: 10 mg/dL (10-16 @ 23:22)  Protein/Creatinine Ratio Calculation: 0.4 Ratio (10-16 @ 23:22)

## 2023-10-18 NOTE — PROGRESS NOTE ADULT - PROBLEM SELECTOR PLAN 1
volume status much improved   -JAVIER severe MR, mitral prolapse   -s/p iv lasix 40 bid, transitioned to po bumex 1mg  -f/u Dr. Drea Castro outpt for possible valve repair

## 2023-10-19 ENCOUNTER — TRANSCRIPTION ENCOUNTER (OUTPATIENT)
Age: 68
End: 2023-10-19

## 2023-10-19 DIAGNOSIS — K59.00 CONSTIPATION, UNSPECIFIED: ICD-10-CM

## 2023-10-19 LAB
ANION GAP SERPL CALC-SCNC: 8 MMOL/L — SIGNIFICANT CHANGE UP (ref 7–14)
ANION GAP SERPL CALC-SCNC: 8 MMOL/L — SIGNIFICANT CHANGE UP (ref 7–14)
BUN SERPL-MCNC: 27 MG/DL — HIGH (ref 7–23)
BUN SERPL-MCNC: 27 MG/DL — HIGH (ref 7–23)
CALCIUM SERPL-MCNC: 8.3 MG/DL — LOW (ref 8.4–10.5)
CALCIUM SERPL-MCNC: 8.3 MG/DL — LOW (ref 8.4–10.5)
CHLORIDE SERPL-SCNC: 87 MMOL/L — LOW (ref 98–107)
CHLORIDE SERPL-SCNC: 87 MMOL/L — LOW (ref 98–107)
CO2 SERPL-SCNC: 30 MMOL/L — SIGNIFICANT CHANGE UP (ref 22–31)
CO2 SERPL-SCNC: 30 MMOL/L — SIGNIFICANT CHANGE UP (ref 22–31)
CREAT SERPL-MCNC: 0.4 MG/DL — LOW (ref 0.5–1.3)
CREAT SERPL-MCNC: 0.4 MG/DL — LOW (ref 0.5–1.3)
EGFR: 108 ML/MIN/1.73M2 — SIGNIFICANT CHANGE UP
EGFR: 108 ML/MIN/1.73M2 — SIGNIFICANT CHANGE UP
GLUCOSE SERPL-MCNC: 92 MG/DL — SIGNIFICANT CHANGE UP (ref 70–99)
GLUCOSE SERPL-MCNC: 92 MG/DL — SIGNIFICANT CHANGE UP (ref 70–99)
HCT VFR BLD CALC: 31.5 % — LOW (ref 34.5–45)
HCT VFR BLD CALC: 31.5 % — LOW (ref 34.5–45)
HGB BLD-MCNC: 11.5 G/DL — SIGNIFICANT CHANGE UP (ref 11.5–15.5)
HGB BLD-MCNC: 11.5 G/DL — SIGNIFICANT CHANGE UP (ref 11.5–15.5)
MAGNESIUM SERPL-MCNC: 2.3 MG/DL — SIGNIFICANT CHANGE UP (ref 1.6–2.6)
MAGNESIUM SERPL-MCNC: 2.3 MG/DL — SIGNIFICANT CHANGE UP (ref 1.6–2.6)
MCHC RBC-ENTMCNC: 35.9 PG — HIGH (ref 27–34)
MCHC RBC-ENTMCNC: 35.9 PG — HIGH (ref 27–34)
MCHC RBC-ENTMCNC: 36.5 GM/DL — HIGH (ref 32–36)
MCHC RBC-ENTMCNC: 36.5 GM/DL — HIGH (ref 32–36)
MCV RBC AUTO: 98.4 FL — SIGNIFICANT CHANGE UP (ref 80–100)
MCV RBC AUTO: 98.4 FL — SIGNIFICANT CHANGE UP (ref 80–100)
NRBC # BLD: 0 /100 WBCS — SIGNIFICANT CHANGE UP (ref 0–0)
NRBC # BLD: 0 /100 WBCS — SIGNIFICANT CHANGE UP (ref 0–0)
NRBC # FLD: 0 K/UL — SIGNIFICANT CHANGE UP (ref 0–0)
NRBC # FLD: 0 K/UL — SIGNIFICANT CHANGE UP (ref 0–0)
PHOSPHATE SERPL-MCNC: 1.9 MG/DL — LOW (ref 2.5–4.5)
PHOSPHATE SERPL-MCNC: 1.9 MG/DL — LOW (ref 2.5–4.5)
PLATELET # BLD AUTO: 137 K/UL — LOW (ref 150–400)
PLATELET # BLD AUTO: 137 K/UL — LOW (ref 150–400)
POTASSIUM SERPL-MCNC: 4 MMOL/L — SIGNIFICANT CHANGE UP (ref 3.5–5.3)
POTASSIUM SERPL-MCNC: 4 MMOL/L — SIGNIFICANT CHANGE UP (ref 3.5–5.3)
POTASSIUM SERPL-SCNC: 4 MMOL/L — SIGNIFICANT CHANGE UP (ref 3.5–5.3)
POTASSIUM SERPL-SCNC: 4 MMOL/L — SIGNIFICANT CHANGE UP (ref 3.5–5.3)
RBC # BLD: 3.2 M/UL — LOW (ref 3.8–5.2)
RBC # BLD: 3.2 M/UL — LOW (ref 3.8–5.2)
RBC # FLD: 11.9 % — SIGNIFICANT CHANGE UP (ref 10.3–14.5)
RBC # FLD: 11.9 % — SIGNIFICANT CHANGE UP (ref 10.3–14.5)
SODIUM SERPL-SCNC: 125 MMOL/L — LOW (ref 135–145)
SODIUM SERPL-SCNC: 125 MMOL/L — LOW (ref 135–145)
WBC # BLD: 5.5 K/UL — SIGNIFICANT CHANGE UP (ref 3.8–10.5)
WBC # BLD: 5.5 K/UL — SIGNIFICANT CHANGE UP (ref 3.8–10.5)
WBC # FLD AUTO: 5.5 K/UL — SIGNIFICANT CHANGE UP (ref 3.8–10.5)
WBC # FLD AUTO: 5.5 K/UL — SIGNIFICANT CHANGE UP (ref 3.8–10.5)

## 2023-10-19 PROCEDURE — 74018 RADEX ABDOMEN 1 VIEW: CPT | Mod: 26

## 2023-10-19 PROCEDURE — 99233 SBSQ HOSP IP/OBS HIGH 50: CPT

## 2023-10-19 PROCEDURE — 72170 X-RAY EXAM OF PELVIS: CPT | Mod: 26

## 2023-10-19 RX ORDER — ZINC OXIDE 200 MG/G
1 OINTMENT TOPICAL
Refills: 0 | Status: DISCONTINUED | OUTPATIENT
Start: 2023-10-19 | End: 2023-10-19

## 2023-10-19 RX ORDER — MINERAL OIL
133 OIL (ML) MISCELLANEOUS ONCE
Refills: 0 | Status: COMPLETED | OUTPATIENT
Start: 2023-10-19 | End: 2023-10-19

## 2023-10-19 RX ORDER — TOLVAPTAN 15 MG/1
15 TABLET ORAL ONCE
Refills: 0 | Status: COMPLETED | OUTPATIENT
Start: 2023-10-19 | End: 2023-10-19

## 2023-10-19 RX ORDER — SODIUM,POTASSIUM PHOSPHATES 278-250MG
2 POWDER IN PACKET (EA) ORAL
Refills: 0 | Status: COMPLETED | OUTPATIENT
Start: 2023-10-19 | End: 2023-10-20

## 2023-10-19 RX ORDER — ZINC OXIDE 200 MG/G
1 OINTMENT TOPICAL
Refills: 0 | Status: DISCONTINUED | OUTPATIENT
Start: 2023-10-19 | End: 2023-10-20

## 2023-10-19 RX ORDER — ACETAMINOPHEN 500 MG
650 TABLET ORAL EVERY 6 HOURS
Refills: 0 | Status: DISCONTINUED | OUTPATIENT
Start: 2023-10-19 | End: 2023-10-20

## 2023-10-19 RX ORDER — POLYETHYLENE GLYCOL 3350 17 G/17G
17 POWDER, FOR SOLUTION ORAL
Refills: 0 | Status: DISCONTINUED | OUTPATIENT
Start: 2023-10-19 | End: 2023-10-20

## 2023-10-19 RX ADMIN — TOLVAPTAN 15 MILLIGRAM(S): 15 TABLET ORAL at 12:16

## 2023-10-19 RX ADMIN — POLYETHYLENE GLYCOL 3350 17 GRAM(S): 17 POWDER, FOR SOLUTION ORAL at 12:15

## 2023-10-19 RX ADMIN — Medication 2 PACKET(S): at 18:49

## 2023-10-19 RX ADMIN — Medication 650 MILLIGRAM(S): at 14:30

## 2023-10-19 RX ADMIN — Medication 133 MILLILITER(S): at 12:16

## 2023-10-19 RX ADMIN — HEPARIN SODIUM 5000 UNIT(S): 5000 INJECTION INTRAVENOUS; SUBCUTANEOUS at 21:48

## 2023-10-19 RX ADMIN — BUMETANIDE 1 MILLIGRAM(S): 0.25 INJECTION INTRAMUSCULAR; INTRAVENOUS at 06:07

## 2023-10-19 RX ADMIN — HEPARIN SODIUM 5000 UNIT(S): 5000 INJECTION INTRAVENOUS; SUBCUTANEOUS at 06:07

## 2023-10-19 RX ADMIN — POLYETHYLENE GLYCOL 3350 17 GRAM(S): 17 POWDER, FOR SOLUTION ORAL at 21:48

## 2023-10-19 RX ADMIN — Medication 25 MICROGRAM(S): at 06:07

## 2023-10-19 RX ADMIN — Medication 650 MILLIGRAM(S): at 13:05

## 2023-10-19 NOTE — DIETITIAN INITIAL EVALUATION ADULT - CALCULATED FROM (CAL/KG)
FWD to Dr Garcia  order for  Urology DX Erectile dysfunction, unspecified erectile dysfunction type   order canceled.   The department unable to contact pt.  Please advise.     [id number]

## 2023-10-19 NOTE — DIETITIAN INITIAL EVALUATION ADULT - OTHER INFO
Patient is currently ordered for a PO diet with a 1500mL fluid restriction. Patient reports a good appetite and PO intake at meals during course of admission, states "I am eating everything they are giving me." Would like to receive double protein portions with meals (coordinated via CBORD). Deferred writer's offer of an oral nutrition supplement, states she dislikes the taste of all of them. Patient denies any chewing or swallowing difficulty on current diet order. Patient reports chronic constipation. Noted to be on a bowel regimen. Last BM 10/8/23 per RN flowsheet documentation. Patient denies any current nausea, vomiting, or diarrhea.    Writer provided verbal education regarding current diet order and nutrition recommendations for after discharge, including the importance of consuming adqeuate protein and strategies to create balanced meals. Patient and  verbalized understanding to the discussion.

## 2023-10-19 NOTE — CHART NOTE - NSCHARTNOTEFT_GEN_A_CORE
Notified by RN that patient was having c/o constipation and sat on commode for 2 hours with finger inside rectum to help her have a bowel movement. Upon RN telling patient to not insert her fingers in rectum, patient claims she inserted a small metal piece which is "the size of a dime" and "soft on outside" in rectum for relief. RN reports not seeing certain metal.    Patient seen at bedside with colleagues. No metal or object visualized upon external inspection and rectal examination.  Ordered abdominal and pelvis xray to visualize foreign body.   D/w radiologist who states no radiopaque material is seen. Will f/u official results.     Patient educated on not to insert anything in rectum. Patient already on senna from day time. Added on dulcolax.   RN instructed to remove other metal objects from room.   Will continue to monitor patient overnight. Notified by RN that patient was having c/o constipation and sat on commode for 2 hours with finger inside rectum to help her have a bowel movement. Upon RN telling patient to not insert her fingers in rectum, patient claims she inserted a small metal piece which is "the size of a dime" and "soft on outside" in rectum for relief. RN reports not seeing certain metal.    Patient seen at bedside with colleagues. No metal or object visualized upon external inspection and rectal examination (chaperoned by MYNOR Castro).  Ordered abdominal and pelvis xray to visualize foreign body.   D/w radiologist who states no radiopaque material is seen. Will f/u official results.     Patient educated on not to insert anything in rectum. Patient already on senna from day time. Added on dulcolax.   RN instructed to remove other metal objects from room.   Will continue to monitor patient overnight.

## 2023-10-19 NOTE — DIETITIAN INITIAL EVALUATION ADULT - PROBLEM SELECTOR PLAN 2
possibly stemming from fluid overloaded state; however, hypovolemic insult possible  as pt reports significantly increased UO since taking lasix 4 days ago  -q6h bmp, uric acid, UA, urine Na ordered  -seen by renal, placed on one time dose of adh antagonist  -q 6h neuro check

## 2023-10-19 NOTE — DISCHARGE NOTE PROVIDER - DETAILS OF MALNUTRITION DIAGNOSIS/DIAGNOSES
This patient has been assessed with a concern for Malnutrition and was treated during this hospitalization for the following Nutrition diagnosis/diagnoses:     -  10/19/2023: Severe protein-calorie malnutrition   -  10/19/2023: Underweight (BMI < 19)

## 2023-10-19 NOTE — DIETITIAN INITIAL EVALUATION ADULT - REASON FOR ADMISSION
Shortness of breath    Patient is a 67y Female with PMH osteoarthritis, chronic kidney disease, anemia, HLD, hypothyroidism who presents with complaint of shortness of breath with minimal exertion, b/l lower extremities edema. Status post fall on her face 1 week ago.

## 2023-10-19 NOTE — DISCHARGE NOTE NURSING/CASE MANAGEMENT/SOCIAL WORK - NSSCTYPOFSERV_GEN_ALL_CORE
SOC for RN/PT scheduled for  Visiting nurse to visit day after discharge and PT will call for appointment once auth is received

## 2023-10-19 NOTE — DIETITIAN INITIAL EVALUATION ADULT - ORAL INTAKE PTA/DIET HISTORY
Patient seen at bedside this AM sitting on commode, patient's  present during interview. Patient reports no known food allergies or food intolerances. Patient does not take any nutrition supplements at home. Patient denies any chewing or swallowing difficulty. Patient reports a good appetite at baseline, reports she consumes large portions of food. Upon review of her typical daily intake, patient tends to consume large volumes of low kcal/protein foods, namely fruits and vegetables. Low intake of whole grains and protein-rich foods. Patient reports she avoids sodium, oils, spices, and herbs. Patient's  reports patient is "weight conscious."    No height or weight documentation noted for current admission.   Patient reports her height as 61inches and usual body weight as 88lb. Unclear weight change history as patient reports weight gain and weight loss secondary to fluid retention.  Unable to obtain bed scale weight during visit due to patient seated on commode throughout interaction.  Unable to retrieve weight data from [facility] at this time, will re-attempt as able.

## 2023-10-19 NOTE — DISCHARGE NOTE PROVIDER - CARE PROVIDER_API CALL
Cate Castro)  Interventional Cardiology  [id number] [place] [address], [address]  [place] [address]  Phone: [phone]  Fax: [phone]  Follow Up Time: 1 month    Zeke Funk  Nephrology  [address] [facility] [address]  [place] [address]  Phone: [phone]  Fax: [phone]  Follow Up Time: 2 weeks

## 2023-10-19 NOTE — DISCHARGE NOTE PROVIDER - PROVIDER TOKENS
PROVIDER:[TOKEN:[[id number]],FOLLOWUP:[1 month]],PROVIDER:[TOKEN:[[id number]],FOLLOWUP:[2 weeks]]

## 2023-10-19 NOTE — PROVIDER CONTACT NOTE (OTHER) - ASSESSMENT
pt is vitally stable. Pt originally stated to the RN that she "usually puts her finger in her rectum to help herself move her bowels". pt requested RN attempt to disimpact her, and RN explained that it was not in her scope of practice. RN told pt that she will get a stool softener ordered. Pt then kept inserting her finger in her rectum, even though pt was educated not to before telling RN that she placed a foreign round object in her rectum after RN administered the stool softener. pt denies any pain.

## 2023-10-19 NOTE — PROGRESS NOTE ADULT - PROBLEM SELECTOR PLAN 5
d/t severe protein-calorie malnutrition   nutrition consult   PT paulino CAMARENA. pt declined, wants home PT   fall precautions

## 2023-10-19 NOTE — PROGRESS NOTE ADULT - SUBJECTIVE AND OBJECTIVE BOX
[facility] Division of Hospital Medicine  Larry Dale MD  Pager [phone]      Patient is a 67y old  Female who presents with a chief complaint of hyponatremia. (19 Oct 2023 13:14)      SUBJECTIVE / OVERNIGHT EVENTS:    overnight event reviewed. Pt reports feeling constipated this am. No new complaint     ADDITIONAL REVIEW OF SYSTEMS:    RESPIRATORY: No cough, wheezing, chills or hemoptysis; No shortness of breath  CARDIOVASCULAR: No chest pain, palpitations, dizziness, or leg swelling  GASTROINTESTINAL: No abdominal or epigastric pain. No nausea, vomiting, or hematemesis; No diarrhea or constipation. No melena or hematochezia.    MEDICATIONS  (STANDING):  buMETAnide 1 milliGRAM(s) Oral daily  heparin   Injectable 5000 Unit(s) SubCutaneous every 8 hours  influenza  Vaccine (HIGH DOSE) 0.7 milliLiter(s) IntraMuscular once  levothyroxine 25 MICROGram(s) Oral daily  polyethylene glycol 3350 17 Gram(s) Oral daily  potassium phosphate / sodium phosphate Powder (PHOS-NaK) 2 Packet(s) Oral two times a day    MEDICATIONS  (PRN):  acetaminophen     Tablet .. 650 milliGRAM(s) Oral every 6 hours PRN Mild Pain (1 - 3)      CAPILLARY BLOOD GLUCOSE        I&O's Summary      PHYSICAL EXAM:  Vital Signs Last 24 Hrs  T(C): 36.3 (19 Oct 2023 14:51), Max: 36.4 (18 Oct 2023 22:50)  T(F): 97.4 (19 Oct 2023 14:51), Max: 97.6 (18 Oct 2023 22:50)  HR: 77 (19 Oct 2023 14:51) (70 - 80)  BP: 130/62 (19 Oct 2023 14:51) (109/61 - 130/62)  BP(mean): --  RR: 16 (19 Oct 2023 14:51) (16 - 18)  SpO2: 100% (19 Oct 2023 14:51) (100% - 100%)    Parameters below as of 19 Oct 2023 14:51  Patient On (Oxygen Delivery Method): room air      CONSTITUTIONAL: NAD,  EYES: PERRLA; conjunctiva and sclera clear  ENMT: Moist oral mucosa, no pharyngeal injection or exudates;   NECK: Supple, no palpable masses;  RESPIRATORY: Normal respiratory effort; lungs are clear to auscultation bilaterally  CARDIOVASCULAR: Regular rate and rhythm, normal S1 and S2, no murmur/rub/gallop; trace lower extremity edema; Peripheral pulses are 2+ bilaterally  ABDOMEN: Nontender to palpation, normoactive bowel sounds, no rebound/guarding;   MUSCLOSKELETAL:   no clubbing or cyanosis of digits; no joint swelling or tenderness to palpation  PSYCH: A+O to person, place  NEUROLOGY: CN 2-12 are intact and symmetric; no gross sensory deficits;   SKIN: bruising on face  b/l LE venous stasis changes       LABS:                        11.5   5.50  )-----------( 137      ( 19 Oct 2023 05:55 )             31.5     10-19    125<L>  |  87<L>  |  27<H>  ----------------------------<  92  4.0   |  30  |  0.40<L>    Ca    8.3<L>      19 Oct 2023 05:55  Phos  1.9     10-19  Mg     2.30     10-19    TPro  5.7<L>  /  Alb  3.8  /  TBili  0.5  /  DBili  x   /  AST  68<H>  /  ALT  39<H>  /  AlkPhos  55  10-18          Urinalysis Basic - ( 19 Oct 2023 05:55 )    Color: x / Appearance: x / SG: x / pH: x  Gluc: 92 mg/dL / Ketone: x  / Bili: x / Urobili: x   Blood: x / Protein: x / Nitrite: x   Leuk Esterase: x / RBC: x / WBC x   Sq Epi: x / Non Sq Epi: x / Bacteria: x          RADIOLOGY & ADDITIONAL TESTS:  Results Reviewed:   Imaging Personally Reviewed:  Electrocardiogram Personally Reviewed:    COORDINATION OF CARE:  Care Discussed with Consultants/Other Providers [Y/N]:  Prior or Outpatient Records Reviewed [Y/N]:

## 2023-10-19 NOTE — DIETITIAN INITIAL EVALUATION ADULT - PERTINENT MEDS FT
MEDICATIONS  (STANDING):  buMETAnide 1 milliGRAM(s) Oral daily  heparin   Injectable 5000 Unit(s) SubCutaneous every 8 hours  influenza  Vaccine (HIGH DOSE) 0.7 milliLiter(s) IntraMuscular once  levothyroxine 25 MICROGram(s) Oral daily  polyethylene glycol 3350 17 Gram(s) Oral daily  potassium phosphate / sodium phosphate Powder (PHOS-NaK) 2 Packet(s) Oral two times a day    MEDICATIONS  (PRN):  acetaminophen     Tablet .. 650 milliGRAM(s) Oral every 6 hours PRN Mild Pain (1 - 3)

## 2023-10-19 NOTE — PROGRESS NOTE ADULT - PROBLEM SELECTOR PLAN 2
likely hypervolemic hyponatremia. Na downtrended to 125 today  -s/p tolvaptan and iv lasix. received repeat dose tolvaptan this am  -c/w po bumex 1mg as per nephro

## 2023-10-19 NOTE — DIETITIAN INITIAL EVALUATION ADULT - PERTINENT LABORATORY DATA
10-19    125<L>  |  87<L>  |  27<H>  ----------------------------<  92  4.0   |  30  |  0.40<L>    Ca    8.3<L>      19 Oct 2023 05:55  Phos  1.9     10-19  Mg     2.30     10-19    TPro  5.7<L>  /  Alb  3.8  /  TBili  0.5  /  DBili  x   /  AST  68<H>  /  ALT  39<H>  /  AlkPhos  55  10-18

## 2023-10-19 NOTE — DISCHARGE NOTE PROVIDER - CARE PROVIDERS DIRECT ADDRESSES
[email or web address]

## 2023-10-19 NOTE — PROVIDER CONTACT NOTE (OTHER) - SITUATION
pt is a 67 year old female who is stating she placed an object in her rectum when RN told her to take her finger out of her rectum

## 2023-10-19 NOTE — CHART NOTE - NSCHARTNOTEFT_GEN_A_CORE
Discussed abdominal and pelvic imaging results with attending as patient found to have severe constipation and persistently manually disimpacting herself with a history of hemorrhoids, some noted to be visible by nursing and ACP staff.  For now, increased miralax to BID and will continue to monitor. Discussed abdominal and pelvic imaging results with attending as patient found to have severe constipation and persistently manually disimpacting herself with a history of hemorrhoids, some noted to be visible by nursing and ACP staff.  For now, increased miralax to BID and will continue to monitor BMs.    Addendum; after discussion with GI fellow via TEAMS and asked safety if ok to manually disimpact in a patient with possible hemorrhoids, this ACP manually disimpacted the patient without complication.  Several stool balls were removed and the area was emptied to the best of my ability.  The night team and attending are aware and will continue to monitor patient.

## 2023-10-19 NOTE — PROVIDER CONTACT NOTE (OTHER) - BACKGROUND
pt is 67 year old female admitted for shortness of breath. pt originally came in for TTE to assess MR and was found to have a severe MR. pt has a history of constipation and falls.

## 2023-10-19 NOTE — DIETITIAN NUTRITION RISK NOTIFICATION - ADDITIONAL COMMENTS/DIETITIAN RECOMMENDATIONS
Please see Dietitian Initial Assessment for complete recommendations.     Ansley Schaeffer MS RDN CDN  On [employer] Teams, Pager #[phone]

## 2023-10-19 NOTE — DIETITIAN INITIAL EVALUATION ADULT - ADD RECOMMEND
1. Defer fluid management to medical team.  2. Monitor weights, labs, BM's, skin integrity, p.o. intake.   3. Please monitor % PO intake on flowsheets   4. Honor food preferences as able within therapeutic diet order.   5. Consider a Multivitamin to supplement intake, as acceptable per medical team

## 2023-10-19 NOTE — DISCHARGE NOTE PROVIDER - HOSPITAL COURSE
67 y.o. female h/o MV regurg, and hypothyroidism on synthroid;  initially presented for elective JAVIER due to MVR, found to have Na 125 on pre-procedure testing, admitted for hyponatremia.   Pt was evaluated by nephrology. Etiology of hyponatremia is thought to be hypervolemia. TSH wnl. Pt received iv lasix and tolvaptan. Na level improved. She had JAVIER showing normal LVEF and severe MR, referred to interventional cardiology (dr. Castro) for outpt follow up

## 2023-10-19 NOTE — DIETITIAN INITIAL EVALUATION ADULT - REASON INDICATOR FOR ASSESSMENT
Nutrition Consult for MST score 2 or greater  Nutrition Consult for MST score 2 or greater and Malnutrition

## 2023-10-19 NOTE — PROGRESS NOTE ADULT - ASSESSMENT
67 y.o. female presents today for elective JAVIER due to MVR.  Hypervolemic hyponatremia   Hyperkalemia- possibly due to repletion, s/p Veltassa improving   Hypophosphatemia   Failure to thrive     1 Renal- now on bumex 1 mg po qd, no objection to lasix 30 mg po qd on dc(which is what the pt prefers)  Na + declining.  This is not SIADH.  S/p tolvaptan 15 mg po x1 this morning    Give phosnak 2 packets bid x 2 today   2 GI-Miralax for the constipation;  She needs nutritional support   She has refused protein shakes  Now on regular diet without restriction as protein intake must be uninterrupted   3 CVS-Structural heart disease to be made aware of the JAVIER (Dr Cate almanzar aware)   Resume Cozaar 25mg po qd for afterload reduction when able    Sayed Paxton   [facility]  [phone]

## 2023-10-19 NOTE — DISCHARGE NOTE PROVIDER - NSDCHHENCOUNTER_GEN_ALL_CORE
19-Oct-2023 A-T Advancement Flap Text: The defect edges were debeveled with a #15 scalpel blade.  Given the location of the defect, shape of the defect and the proximity to free margins an A-T advancement flap was deemed most appropriate.  Using a sterile surgical marker, an appropriate advancement flap was drawn incorporating the defect and placing the expected incisions within the relaxed skin tension lines where possible.    The area thus outlined was incised deep to adipose tissue with a #15 scalpel blade.  The skin margins were undermined to an appropriate distance in all directions utilizing iris scissors  and carried over to close the primary defect.

## 2023-10-19 NOTE — DISCHARGE NOTE NURSING/CASE MANAGEMENT/SOCIAL WORK - NSDCPEFALRISK_GEN_ALL_CORE
For information on Fall & Injury Prevention, visit: [email or web address] OR  [email or web address] OR  https://www.cdc.gov/steadi/patient.html

## 2023-10-19 NOTE — PROGRESS NOTE ADULT - SUBJECTIVE AND OBJECTIVE BOX
NEPHROLOGY-[facility] [phone]        Patient seen and examined report she is constipated.         MEDICATIONS  (STANDING):  buMETAnide 1 milliGRAM(s) Oral daily  heparin   Injectable 5000 Unit(s) SubCutaneous every 8 hours  influenza  Vaccine (HIGH DOSE) 0.7 milliLiter(s) IntraMuscular once  levothyroxine 25 MICROGram(s) Oral daily  polyethylene glycol 3350 17 Gram(s) Oral daily      VITAL:  T(C): , Max: 36.4 (10-18-23 @ 22:50)  T(F): , Max: 97.6 (10-18-23 @ 22:50)  HR: 70 (10-19-23 @ 06:10)  BP: 116/76 (10-19-23 @ 06:10)  BP(mean): --  RR: 18 (10-19-23 @ 06:10)  SpO2: 100% (10-19-23 @ 06:10)  Wt(kg): --    I and O's:        PHYSICAL EXAM:    Constitutional: NAD  Neck:  No JVD  Respiratory: CTAB/L  Cardiovascular: S1 and S2  Gastrointestinal: BS+, soft, NT/ND  Extremities: No peripheral edema  Neurological: A/O x 3, no focal deficits  Psychiatric: Normal mood, normal affect  : No Dinero  Skin: No rashes  Access: Not applicable    LABS:                        11.5   5.50  )-----------( 137      ( 19 Oct 2023 05:55 )             31.5     10-19    125<L>  |  87<L>  |  27<H>  ----------------------------<  92  4.0   |  30  |  0.40<L>    Ca    8.3<L>      19 Oct 2023 05:55  Phos  1.9     10-19  Mg     2.30     10-19    TPro  5.7<L>  /  Alb  3.8  /  TBili  0.5  /  DBili  x   /  AST  68<H>  /  ALT  39<H>  /  AlkPhos  55  10-18          Urine Studies:  Urinalysis Basic - ( 19 Oct 2023 05:55 )    Color: x / Appearance: x / SG: x / pH: x  Gluc: 92 mg/dL / Ketone: x  / Bili: x / Urobili: x   Blood: x / Protein: x / Nitrite: x   Leuk Esterase: x / RBC: x / WBC x   Sq Epi: x / Non Sq Epi: x / Bacteria: x        Assessment and Plan:   · Assessment	  ASSESSMENT/PLAN:   67 y.o. female presents today for elective JAVIER due to MVR.  Hypervolemic hyponatremia   Hyperkalemia- possibly due to repletion, s/p Veltassa improving   Failure to thrive     1 Renal- now on bumex 1 mg po qd, no objection to lasix 30 mg po qd   Na + declining.  This is not SIADH.  S/p tolvaptan 15 mg po x1 this morning    2 GI-Miralax for the constipation;  She needs nutritional support   She has refused protein shakes  Now on regular diet without restriction as protein intake must be uninterrupted   3 CVS-Structural heart disease to be made aware of the JAVIER (Dr Cate almanzar aware)    Cozaar 25mg po qd for afterload reduction stopped?    [facility]  [facility]  [phone]      NEPHROLOGY-[facility] [phone]        Patient seen and examined report she is constipated.         MEDICATIONS  (STANDING):  buMETAnide 1 milliGRAM(s) Oral daily  heparin   Injectable 5000 Unit(s) SubCutaneous every 8 hours  influenza  Vaccine (HIGH DOSE) 0.7 milliLiter(s) IntraMuscular once  levothyroxine 25 MICROGram(s) Oral daily  polyethylene glycol 3350 17 Gram(s) Oral daily      VITAL:  T(C): , Max: 36.4 (10-18-23 @ 22:50)  T(F): , Max: 97.6 (10-18-23 @ 22:50)  HR: 70 (10-19-23 @ 06:10)  BP: 116/76 (10-19-23 @ 06:10)  BP(mean): --  RR: 18 (10-19-23 @ 06:10)  SpO2: 100% (10-19-23 @ 06:10)  Wt(kg): --    I and O's:        PHYSICAL EXAM:    Constitutional: NAD  Neck:  No JVD  Respiratory: CTAB/L  Cardiovascular: S1 and S2  Gastrointestinal: BS+, soft, NT/ND  Extremities: No peripheral edema  Neurological: A/O x 3, no focal deficits  Psychiatric: Normal mood, normal affect  : No Dinero  Skin: No rashes  Access: Not applicable    LABS:                        11.5   5.50  )-----------( 137      ( 19 Oct 2023 05:55 )             31.5     10-19    125<L>  |  87<L>  |  27<H>  ----------------------------<  92  4.0   |  30  |  0.40<L>    Ca    8.3<L>      19 Oct 2023 05:55  Phos  1.9     10-19  Mg     2.30     10-19    TPro  5.7<L>  /  Alb  3.8  /  TBili  0.5  /  DBili  x   /  AST  68<H>  /  ALT  39<H>  /  AlkPhos  55  10-18          Urine Studies:  Urinalysis Basic - ( 19 Oct 2023 05:55 )    Color: x / Appearance: x / SG: x / pH: x  Gluc: 92 mg/dL / Ketone: x  / Bili: x / Urobili: x   Blood: x / Protein: x / Nitrite: x   Leuk Esterase: x / RBC: x / WBC x   Sq Epi: x / Non Sq Epi: x / Bacteria: x        Assessment and Plan:   · Assessment	  ASSESSMENT/PLAN:   67 y.o. female presents today for elective JAVIER due to MVR.  Hypervolemic hyponatremia   Hyperkalemia- possibly due to repletion, s/p Veltassa improving   Hypophosphatemia   Failure to thrive     1 Renal- now on bumex 1 mg po qd, no objection to lasix 30 mg po qd   Na + declining.  This is not SIADH.  S/p tolvaptan 15 mg po x1 this morning    Give phosnak 2 packets bid x 2 today   2 GI-Miralax for the constipation;  She needs nutritional support   She has refused protein shakes  Now on regular diet without restriction as protein intake must be uninterrupted   3 CVS-Structural heart disease to be made aware of the JAVIER (Dr Cate almanzar aware)   Resume Cozaar 25mg po qd for afterload reduction     Dina Yee NP  [facility]  [phone]      NEPHROLOGY-[facility] [phone]        Patient seen and examined report she is constipated.         MEDICATIONS  (STANDING):  buMETAnide 1 milliGRAM(s) Oral daily  heparin   Injectable 5000 Unit(s) SubCutaneous every 8 hours  influenza  Vaccine (HIGH DOSE) 0.7 milliLiter(s) IntraMuscular once  levothyroxine 25 MICROGram(s) Oral daily  polyethylene glycol 3350 17 Gram(s) Oral daily      VITAL:  T(C): , Max: 36.4 (10-18-23 @ 22:50)  T(F): , Max: 97.6 (10-18-23 @ 22:50)  HR: 70 (10-19-23 @ 06:10)  BP: 116/76 (10-19-23 @ 06:10)  BP(mean): --  RR: 18 (10-19-23 @ 06:10)  SpO2: 100% (10-19-23 @ 06:10)  Wt(kg): --    I and O's:        PHYSICAL EXAM:    Constitutional: NAD; cachetic   Neck:  No JVD  Respiratory: CTAB/L  Cardiovascular: S1 and S2  Gastrointestinal: BS+, soft, NT/ND  Extremities: No peripheral edema  Neurological: A/O x 3, no focal deficits  Psychiatric: Normal mood, normal affect  : No Dinero  Skin: No rashes  Access: Not applicable    LABS:                        11.5   5.50  )-----------( 137      ( 19 Oct 2023 05:55 )             31.5     10-19    125<L>  |  87<L>  |  27<H>  ----------------------------<  92  4.0   |  30  |  0.40<L>    Ca    8.3<L>      19 Oct 2023 05:55  Phos  1.9     10-19  Mg     2.30     10-19    TPro  5.7<L>  /  Alb  3.8  /  TBili  0.5  /  DBili  x   /  AST  68<H>  /  ALT  39<H>  /  AlkPhos  55  10-18          Urine Studies:  Urinalysis Basic - ( 19 Oct 2023 05:55 )    Color: x / Appearance: x / SG: x / pH: x  Gluc: 92 mg/dL / Ketone: x  / Bili: x / Urobili: x   Blood: x / Protein: x / Nitrite: x   Leuk Esterase: x / RBC: x / WBC x   Sq Epi: x / Non Sq Epi: x / Bacteria: x

## 2023-10-19 NOTE — DISCHARGE NOTE PROVIDER - NSDCMRMEDTOKEN_GEN_ALL_CORE_FT
Lasix 20 mg oral tablet: 1 tab(s) orally once a day as needed for b/l lower extremities edema  levothyroxine 25 mcg (0.025 mg) oral tablet: 1 tab(s) orally once a day   Lasix 20 mg oral tablet: 1.5 tab(s) orally once a day as needed for b/l lower extremities edema  levothyroxine 25 mcg (0.025 mg) oral tablet: 1 tab(s) orally once a day  losartan 25 mg oral tablet: 1 tab(s) orally once a day  potassium chloride 10 mEq oral tablet, extended release: 1 tab(s) orally once a day

## 2023-10-19 NOTE — DISCHARGE NOTE NURSING/CASE MANAGEMENT/SOCIAL WORK - PATIENT PORTAL LINK FT
You can access the FollowMyHealth Patient Portal offered by [facility] by registering at the following website: [email or web address]. By joining [facility]’s FollowMyHealth portal, you will also be able to view your health information using other applications (apps) compatible with our system.

## 2023-10-19 NOTE — DIETITIAN INITIAL EVALUATION ADULT - NSFNSPHYEXAMSKINFT_GEN_A_CORE
MASD (sacral/gluteal), incontinence associated dermatitis (perineum), stage I sacrum pressure injury per RN flowsheet

## 2023-10-19 NOTE — DISCHARGE NOTE PROVIDER - NSDCCPCAREPLAN_GEN_ALL_CORE_FT
PRINCIPAL DISCHARGE DIAGNOSIS  Diagnosis: Hyponatremia  Assessment and Plan of Treatment: You received iv lasix and tolvaptan in the hospital. continue lasix as instructed. follow with nephrology in office      SECONDARY DISCHARGE DIAGNOSES  Diagnosis: Mitral valve regurgitation  Assessment and Plan of Treatment: You had JAVIER showing severe MR. Follow with cardiology Dr. Castro for further treatement    Diagnosis: Hypothyroid  Assessment and Plan of Treatment: continue synthroid as instructed    Diagnosis: Constipation  Assessment and Plan of Treatment: Consider high fiber diet. continue stool softner and laxative as needed    Diagnosis: Weakness  Assessment and Plan of Treatment: continue physical therapy. increase food (protein) intake

## 2023-10-20 VITALS
OXYGEN SATURATION: 100 % | DIASTOLIC BLOOD PRESSURE: 58 MMHG | HEART RATE: 81 BPM | RESPIRATION RATE: 18 BRPM | TEMPERATURE: 98 F | SYSTOLIC BLOOD PRESSURE: 104 MMHG

## 2023-10-20 LAB
ANION GAP SERPL CALC-SCNC: 8 MMOL/L — SIGNIFICANT CHANGE UP (ref 7–14)
ANION GAP SERPL CALC-SCNC: 8 MMOL/L — SIGNIFICANT CHANGE UP (ref 7–14)
BUN SERPL-MCNC: 24 MG/DL — HIGH (ref 7–23)
BUN SERPL-MCNC: 24 MG/DL — HIGH (ref 7–23)
CALCIUM SERPL-MCNC: 7.9 MG/DL — LOW (ref 8.4–10.5)
CALCIUM SERPL-MCNC: 7.9 MG/DL — LOW (ref 8.4–10.5)
CHLORIDE SERPL-SCNC: 87 MMOL/L — LOW (ref 98–107)
CHLORIDE SERPL-SCNC: 87 MMOL/L — LOW (ref 98–107)
CO2 SERPL-SCNC: 34 MMOL/L — HIGH (ref 22–31)
CO2 SERPL-SCNC: 34 MMOL/L — HIGH (ref 22–31)
CREAT SERPL-MCNC: 0.39 MG/DL — LOW (ref 0.5–1.3)
CREAT SERPL-MCNC: 0.39 MG/DL — LOW (ref 0.5–1.3)
EGFR: 108 ML/MIN/1.73M2 — SIGNIFICANT CHANGE UP
EGFR: 108 ML/MIN/1.73M2 — SIGNIFICANT CHANGE UP
GLUCOSE SERPL-MCNC: 64 MG/DL — LOW (ref 70–99)
GLUCOSE SERPL-MCNC: 64 MG/DL — LOW (ref 70–99)
HCT VFR BLD CALC: 29.5 % — LOW (ref 34.5–45)
HCT VFR BLD CALC: 29.5 % — LOW (ref 34.5–45)
HGB BLD-MCNC: 10.6 G/DL — LOW (ref 11.5–15.5)
HGB BLD-MCNC: 10.6 G/DL — LOW (ref 11.5–15.5)
MAGNESIUM SERPL-MCNC: 2.3 MG/DL — SIGNIFICANT CHANGE UP (ref 1.6–2.6)
MAGNESIUM SERPL-MCNC: 2.3 MG/DL — SIGNIFICANT CHANGE UP (ref 1.6–2.6)
MCHC RBC-ENTMCNC: 35.7 PG — HIGH (ref 27–34)
MCHC RBC-ENTMCNC: 35.7 PG — HIGH (ref 27–34)
MCHC RBC-ENTMCNC: 35.9 GM/DL — SIGNIFICANT CHANGE UP (ref 32–36)
MCHC RBC-ENTMCNC: 35.9 GM/DL — SIGNIFICANT CHANGE UP (ref 32–36)
MCV RBC AUTO: 99.3 FL — SIGNIFICANT CHANGE UP (ref 80–100)
MCV RBC AUTO: 99.3 FL — SIGNIFICANT CHANGE UP (ref 80–100)
NRBC # BLD: 0 /100 WBCS — SIGNIFICANT CHANGE UP (ref 0–0)
NRBC # BLD: 0 /100 WBCS — SIGNIFICANT CHANGE UP (ref 0–0)
NRBC # FLD: 0 K/UL — SIGNIFICANT CHANGE UP (ref 0–0)
NRBC # FLD: 0 K/UL — SIGNIFICANT CHANGE UP (ref 0–0)
PHOSPHATE SERPL-MCNC: 2.3 MG/DL — LOW (ref 2.5–4.5)
PHOSPHATE SERPL-MCNC: 2.3 MG/DL — LOW (ref 2.5–4.5)
PLATELET # BLD AUTO: 120 K/UL — LOW (ref 150–400)
PLATELET # BLD AUTO: 120 K/UL — LOW (ref 150–400)
POTASSIUM SERPL-MCNC: 3.1 MMOL/L — LOW (ref 3.5–5.3)
POTASSIUM SERPL-MCNC: 3.1 MMOL/L — LOW (ref 3.5–5.3)
POTASSIUM SERPL-SCNC: 3.1 MMOL/L — LOW (ref 3.5–5.3)
POTASSIUM SERPL-SCNC: 3.1 MMOL/L — LOW (ref 3.5–5.3)
RBC # BLD: 2.97 M/UL — LOW (ref 3.8–5.2)
RBC # BLD: 2.97 M/UL — LOW (ref 3.8–5.2)
RBC # FLD: 11.9 % — SIGNIFICANT CHANGE UP (ref 10.3–14.5)
RBC # FLD: 11.9 % — SIGNIFICANT CHANGE UP (ref 10.3–14.5)
SODIUM SERPL-SCNC: 129 MMOL/L — LOW (ref 135–145)
SODIUM SERPL-SCNC: 129 MMOL/L — LOW (ref 135–145)
WBC # BLD: 4.04 K/UL — SIGNIFICANT CHANGE UP (ref 3.8–10.5)
WBC # BLD: 4.04 K/UL — SIGNIFICANT CHANGE UP (ref 3.8–10.5)
WBC # FLD AUTO: 4.04 K/UL — SIGNIFICANT CHANGE UP (ref 3.8–10.5)
WBC # FLD AUTO: 4.04 K/UL — SIGNIFICANT CHANGE UP (ref 3.8–10.5)

## 2023-10-20 PROCEDURE — 99239 HOSP IP/OBS DSCHRG MGMT >30: CPT

## 2023-10-20 RX ORDER — LOSARTAN POTASSIUM 100 MG/1
1 TABLET, FILM COATED ORAL
Qty: 14 | Refills: 0
Start: 2023-10-20 | End: 2023-11-02

## 2023-10-20 RX ORDER — POLYETHYLENE GLYCOL 3350 17 G/17G
17 POWDER, FOR SOLUTION ORAL ONCE
Refills: 0 | Status: COMPLETED | OUTPATIENT
Start: 2023-10-20 | End: 2023-10-20

## 2023-10-20 RX ORDER — POLYETHYLENE GLYCOL 3350 17 G/17G
17 POWDER, FOR SOLUTION ORAL
Refills: 0 | Status: DISCONTINUED | OUTPATIENT
Start: 2023-10-20 | End: 2023-10-20

## 2023-10-20 RX ORDER — LOSARTAN POTASSIUM 100 MG/1
25 TABLET, FILM COATED ORAL DAILY
Refills: 0 | Status: DISCONTINUED | OUTPATIENT
Start: 2023-10-20 | End: 2023-10-20

## 2023-10-20 RX ORDER — POTASSIUM CHLORIDE 20 MEQ
1 PACKET (EA) ORAL
Qty: 10 | Refills: 0
Start: 2023-10-20 | End: 2023-10-29

## 2023-10-20 RX ORDER — POTASSIUM CHLORIDE 20 MEQ
20 PACKET (EA) ORAL ONCE
Refills: 0 | Status: COMPLETED | OUTPATIENT
Start: 2023-10-20 | End: 2023-10-20

## 2023-10-20 RX ADMIN — HEPARIN SODIUM 5000 UNIT(S): 5000 INJECTION INTRAVENOUS; SUBCUTANEOUS at 14:24

## 2023-10-20 RX ADMIN — POLYETHYLENE GLYCOL 3350 17 GRAM(S): 17 POWDER, FOR SOLUTION ORAL at 09:25

## 2023-10-20 RX ADMIN — POLYETHYLENE GLYCOL 3350 17 GRAM(S): 17 POWDER, FOR SOLUTION ORAL at 14:31

## 2023-10-20 RX ADMIN — Medication 25 MICROGRAM(S): at 05:28

## 2023-10-20 RX ADMIN — HEPARIN SODIUM 5000 UNIT(S): 5000 INJECTION INTRAVENOUS; SUBCUTANEOUS at 05:28

## 2023-10-20 RX ADMIN — BUMETANIDE 1 MILLIGRAM(S): 0.25 INJECTION INTRAMUSCULAR; INTRAVENOUS at 05:28

## 2023-10-20 RX ADMIN — Medication 20 MILLIEQUIVALENT(S): at 14:23

## 2023-10-20 RX ADMIN — Medication 2 PACKET(S): at 05:27

## 2023-10-20 NOTE — CHART NOTE - NSCHARTNOTEFT_GEN_A_CORE
Due to severe mitral regurgitation resulting in SOB with limited ambulation ability, patient requires transport chair and commode.    Alberto Huggins PA-C  pager [phone] Due to severe mitral regurgitation resulting in SOB with limited ambulation ability, patient requires transport chair and commode. Patient can not do ADLs with walker, cane, or crutches. Patient unable to self propel, wheel chair to be used within the home and to and from MD appointments. Care giver willing to assist.     Alberto Huggins PA-C  pager [phone]

## 2023-10-20 NOTE — PROGRESS NOTE ADULT - PROBLEM SELECTOR PROBLEM 6
no edema,  no murmurs,  regular rate and rhythm , no edema.
Weakness
Encounter for deep vein thrombosis (DVT) prophylaxis
Encounter for deep vein thrombosis (DVT) prophylaxis

## 2023-10-20 NOTE — PROGRESS NOTE ADULT - PROBLEM/PLAN-6
DISPLAY PLAN FREE TEXT
[facility]

## 2023-10-20 NOTE — PROGRESS NOTE ADULT - SUBJECTIVE AND OBJECTIVE BOX
NEPHROLOGY     Patient seen and examined with family at bedside, having breakfast, pt reports of still feeling constipated, denies cp or sob, in no acute distress.     MEDICATIONS  (STANDING):  buMETAnide 1 milliGRAM(s) Oral daily  heparin   Injectable 5000 Unit(s) SubCutaneous every 8 hours  influenza  Vaccine (HIGH DOSE) 0.7 milliLiter(s) IntraMuscular once  levothyroxine 25 MICROGram(s) Oral daily  polyethylene glycol 3350 17 Gram(s) Oral two times a day    VITALS:  T(C): , Max: 36.6 (10-19-23 @ 20:09)  T(F): , Max: 97.8 (10-19-23 @ 20:09)  HR: 72 (10-20-23 @ 05:01)  BP: 115/64 (10-20-23 @ 05:01)  BP(mean): --  RR: 17 (10-20-23 @ 05:01)  SpO2: 100% (10-20-23 @ 05:01)    PHYSICAL EXAM:  Constitutional: NAD; cachetic   Neck:  No JVD  Respiratory: CTAB/L  Cardiovascular: S1 and S2  Gastrointestinal: BS+, soft, NT/ND  Extremities: No peripheral edema  Neurological: A/O x 3, no focal deficits  Psychiatric: Normal mood, normal affect  : No Dinero  Skin: No rashes    LABS:                        10.6   4.04  )-----------( 120      ( 20 Oct 2023 09:08 )             29.5     10-19    125<L>  |  87<L>  |  27<H>  ----------------------------<  92  4.0   |  30  |  0.40<L>    Ca    8.3<L>      19 Oct 2023 05:55  Phos  1.9     10-19  Mg     2.30     10-19    ASSESSMENT/PLAN:   67 y.o. female presents today for elective JAVIER due to MVR.  Hypervolemic hyponatremia   Hyperkalemia- possibly due to repletion, improved s/p Veltassa  Hypophosphatemia - s/p phosnak 2 packets yesterday   Failure to thrive     1 Renal- now on bumex 1 mg po qd, no objection to lasix 30 mg po qd on dc(which is what the pt prefers)  Na + declining as of late This is not SIADH.  S/p tolvaptan 15 mg po x1 yesterday  Labs pending this a.m.   2 GI-Miralax for the constipation;  She needs nutritional support   She has refused protein shakes  Now on regular diet without restriction as protein intake must be uninterrupted   3 CVS-Structural heart disease aware of the JAVIER (Dr Cate almanzar aware)   Resume Cozaar 25mg po qd for afterload reduction when able     NEPHROLOGY     Patient seen and examined with family at bedside, having breakfast, pt reports of still feeling constipated, denies cp or sob, in no acute distress.     MEDICATIONS  (STANDING):  buMETAnide 1 milliGRAM(s) Oral daily  heparin   Injectable 5000 Unit(s) SubCutaneous every 8 hours  influenza  Vaccine (HIGH DOSE) 0.7 milliLiter(s) IntraMuscular once  levothyroxine 25 MICROGram(s) Oral daily  polyethylene glycol 3350 17 Gram(s) Oral two times a day    VITALS:  T(C): , Max: 36.6 (10-19-23 @ 20:09)  T(F): , Max: 97.8 (10-19-23 @ 20:09)  HR: 72 (10-20-23 @ 05:01)  BP: 115/64 (10-20-23 @ 05:01)  BP(mean): --  RR: 17 (10-20-23 @ 05:01)  SpO2: 100% (10-20-23 @ 05:01)    PHYSICAL EXAM:  Constitutional: NAD; cachetic   Neck:  No JVD  Respiratory: CTAB/L  Cardiovascular: S1 and S2  Gastrointestinal: BS+, soft, NT/ND  Extremities: No peripheral edema  Neurological: A/O x 3, no focal deficits  Psychiatric: Normal mood, normal affect  : No Dinero  Skin: No rashes    LABS:                        10.6   4.04  )-----------( 120      ( 20 Oct 2023 09:08 )             29.5     10-19    125<L>  |  87<L>  |  27<H>  ----------------------------<  92  4.0   |  30  |  0.40<L>    Ca    8.3<L>      19 Oct 2023 05:55  Phos  1.9     10-19  Mg     2.30     10-19    ASSESSMENT/PLAN:   67 y.o. female presents today for elective JAVIER due to MVR.  Hypervolemic hyponatremia   Hyperkalemia- possibly due to repletion, improved s/p Veltassa, now lower   Hypophosphatemia - s/p phosnak 2 packets yesterday   Failure to thrive     1 Renal- now on bumex 1 mg po qd, no objection to lasix 30 mg po qd on dc(which is what the pt prefers)  Na + declining as of late This is not SIADH.  S/p tolvaptan 15 mg po x1 yesterday, no tolvaptan today   Kdur 20 mEq x 1, dc on Kdur 10 mEq qd  2 GI-Miralax for the constipation;  She needs nutritional support   She has refused protein shakes  Now on regular diet without restriction as protein intake must be uninterrupted   3 CVS-Structural heart disease aware of the JAVIER (Dr Cate almanzar aware)   Resume Cozaar 25mg po qd for afterload reduction when able     NEPHROLOGY     Patient seen and examined with family at bedside, having breakfast, pt reports of still feeling constipated, denies cp or sob, in no acute distress.     MEDICATIONS  (STANDING):  buMETAnide 1 milliGRAM(s) Oral daily  heparin   Injectable 5000 Unit(s) SubCutaneous every 8 hours  influenza  Vaccine (HIGH DOSE) 0.7 milliLiter(s) IntraMuscular once  levothyroxine 25 MICROGram(s) Oral daily  polyethylene glycol 3350 17 Gram(s) Oral two times a day    VITALS:  T(C): , Max: 36.6 (10-19-23 @ 20:09)  T(F): , Max: 97.8 (10-19-23 @ 20:09)  HR: 72 (10-20-23 @ 05:01)  BP: 115/64 (10-20-23 @ 05:01)  BP(mean): --  RR: 17 (10-20-23 @ 05:01)  SpO2: 100% (10-20-23 @ 05:01)    PHYSICAL EXAM:  Constitutional: NAD; cachetic   Neck:  No JVD  Respiratory: CTAB/L  Cardiovascular: S1 and S2  Gastrointestinal: BS+, soft, NT/ND  Extremities: No peripheral edema  Neurological: A/O x 3, no focal deficits  Psychiatric: Normal mood, normal affect  : No Dinero  Skin: No rashes    LABS:                        10.6   4.04  )-----------( 120      ( 20 Oct 2023 09:08 )             29.5     10-19    125<L>  |  87<L>  |  27<H>  ----------------------------<  92  4.0   |  30  |  0.40<L>    Ca    8.3<L>      19 Oct 2023 05:55  Phos  1.9     10-19  Mg     2.30     10-19    ASSESSMENT/PLAN:   67 y.o. female presents today for elective JAVIER due to MVR.  Hypervolemic hyponatremia   Hyperkalemia- possibly due to repletion, improved s/p Veltassa, now lower   Hypophosphatemia - improving s/p phosnak 2 packets yesterday   Failure to thrive     1 Renal- now on bumex 1 mg po qd, no objection to lasix 30 mg po qd on dc(which is what the pt prefers)  Na + declining as of late This is not SIADH.  S/p tolvaptan 15 mg po x1 yesterday, no tolvaptan today   Kdur 20 mEq x 1, dc on Kdur 10 mEq qd  Phosnak 1 packet po x 1   2 GI-Miralax for the constipation;  She needs nutritional support   She has refused protein shakes  Now on regular diet without restriction as protein intake must be uninterrupted   3 CVS-Structural heart disease aware of the JAVIER (Dr Cate almanzar aware)   Continue Cozaar 25mg po qd for afterload reduction            NEPHROLOGY     Patient seen and examined with family at bedside, having breakfast, pt reports of still feeling constipated, denies cp or sob, in no acute distress.     MEDICATIONS  (STANDING):  buMETAnide 1 milliGRAM(s) Oral daily  heparin   Injectable 5000 Unit(s) SubCutaneous every 8 hours  influenza  Vaccine (HIGH DOSE) 0.7 milliLiter(s) IntraMuscular once  levothyroxine 25 MICROGram(s) Oral daily  polyethylene glycol 3350 17 Gram(s) Oral two times a day    VITALS:  T(C): , Max: 36.6 (10-19-23 @ 20:09)  T(F): , Max: 97.8 (10-19-23 @ 20:09)  HR: 72 (10-20-23 @ 05:01)  BP: 115/64 (10-20-23 @ 05:01)  BP(mean): --  RR: 17 (10-20-23 @ 05:01)  SpO2: 100% (10-20-23 @ 05:01)    PHYSICAL EXAM:  Constitutional: NAD; cachetic   Neck:  No JVD  Respiratory: CTAB/L  Cardiovascular: S1 and S2  Gastrointestinal: BS+, soft, NT/ND  Extremities: No peripheral edema  Neurological: A/O x 3, no focal deficits  Psychiatric: Normal mood, normal affect  : No Dinero  Skin: No rashes    LABS:                        10.6   4.04  )-----------( 120      ( 20 Oct 2023 09:08 )             29.5     10-19    125<L>  |  87<L>  |  27<H>  ----------------------------<  92  4.0   |  30  |  0.40<L>    Ca    8.3<L>      19 Oct 2023 05:55  Phos  1.9     10-19  Mg     2.30     10-19

## 2023-10-20 NOTE — PROGRESS NOTE ADULT - PROBLEM SELECTOR PLAN 2
likely hypervolemic hyponatremia. Na improved to 129 today   -s/p tolvaptan and iv lasix. received repeat dose tolvaptan 10/19  -c/w po bumex 1mg as per nephro

## 2023-10-20 NOTE — PROGRESS NOTE ADULT - PROBLEM SELECTOR PROBLEM 1
Mitral valve regurgitation

## 2023-10-20 NOTE — PROGRESS NOTE ADULT - NUTRITIONAL ASSESSMENT
This patient has been assessed with a concern for Malnutrition and has been determined to have a diagnosis/diagnoses of Severe protein-calorie malnutrition and Underweight (BMI < 19).    This patient is being managed with:   Diet Regular-  Entered: Oct 20 2023 11:41AM

## 2023-10-20 NOTE — PROGRESS NOTE ADULT - ASSESSMENT
ASSESSMENT/PLAN:   67 y.o. female presents today for elective JAVIER due to MVR.  Hypervolemic hyponatremia   Hyperkalemia- possibly due to repletion, improved s/p V[place], now lower   Hypophosphatemia - improving s/p phosnak 2 packets yesterday   Failure to thrive     1 Renal- now on bumex 1 mg po qd, no objection to lasix 30 mg po qd on dc(which is what the pt prefers)  Na + declining as of late This is not SIADH.  S/p tolvaptan 15 mg po x1 yesterday, no tolvaptan today   Kdur 20 mEq x 1, dc on Kdur 10 mEq qd  Phosnak 1 packet po x 1   2 GI-Miralax for the constipation;  She needs nutritional support   She has refused protein shakes  Now on regular diet without restriction as protein intake must be uninterrupted   3 [facility] aware of the JAVIER (Dr Cate almanzar aware)   If BP can tolerate then  Cozaar 25mg po qd for afterload reduction       [facility]   [phone]

## 2023-10-20 NOTE — PROGRESS NOTE ADULT - PROVIDER SPECIALTY LIST ADULT
Nephrology
Anesthesia
Hospitalist
Nephrology
Hospitalist

## 2023-10-20 NOTE — PROGRESS NOTE ADULT - SUBJECTIVE AND OBJECTIVE BOX
[facility] Division of Hospital Medicine  Larry Dale MD  Pager [phone]      Patient is a 67y old  Female who presents with a chief complaint of hyponatremia (20 Oct 2023 09:54)      SUBJECTIVE / OVERNIGHT EVENTS:    no acute event o/n. Pt was manually disimpacted yesterday, reports feeling better today. Offered repeat enema but pt declined. no new complaints     ADDITIONAL REVIEW OF SYSTEMS:    RESPIRATORY: No cough, wheezing, chills or hemoptysis; No shortness of breath  CARDIOVASCULAR: No chest pain, palpitations, dizziness, or leg swelling  GASTROINTESTINAL: No abdominal or epigastric pain. No nausea, vomiting, or hematemesis; + constipation. No melena or hematochezia.      MEDICATIONS  (STANDING):  buMETAnide 1 milliGRAM(s) Oral daily  heparin   Injectable 5000 Unit(s) SubCutaneous every 8 hours  influenza  Vaccine (HIGH DOSE) 0.7 milliLiter(s) IntraMuscular once  levothyroxine 25 MICROGram(s) Oral daily  losartan 25 milliGRAM(s) Oral daily    MEDICATIONS  (PRN):  acetaminophen     Tablet .. 650 milliGRAM(s) Oral every 6 hours PRN Mild Pain (1 - 3)  polyethylene glycol 3350 17 Gram(s) Oral two times a day PRN Constipation  zinc oxide 20% Ointment 1 Application(s) Topical two times a day PRN anal irritation      CAPILLARY BLOOD GLUCOSE        I&O's Summary      PHYSICAL EXAM:  Vital Signs Last 24 Hrs  T(C): 36.6 (20 Oct 2023 14:00), Max: 36.6 (19 Oct 2023 20:09)  T(F): 97.8 (20 Oct 2023 14:00), Max: 97.8 (19 Oct 2023 20:09)  HR: 81 (20 Oct 2023 14:00) (68 - 81)  BP: 104/58 (20 Oct 2023 14:00) (104/58 - 128/68)  BP(mean): --  RR: 18 (20 Oct 2023 14:00) (17 - 18)  SpO2: 100% (20 Oct 2023 14:00) (100% - 100%)    Parameters below as of 20 Oct 2023 05:01  Patient On (Oxygen Delivery Method): room air        CONSTITUTIONAL: NAD,  EYES: PERRLA; conjunctiva and sclera clear  ENMT: Moist oral mucosa, no pharyngeal injection or exudates;   NECK: Supple, no palpable masses;  RESPIRATORY: Normal respiratory effort; lungs are clear to auscultation bilaterally  CARDIOVASCULAR: Regular rate and rhythm, normal S1 and S2, no murmur/rub/gallop; trace lower extremity edema; Peripheral pulses are 2+ bilaterally  ABDOMEN: Nontender to palpation, normoactive bowel sounds, no rebound/guarding;   MUSCLOSKELETAL:   no clubbing or cyanosis of digits; no joint swelling or tenderness to palpation  PSYCH: A+O to person, place  NEUROLOGY: CN 2-12 are intact and symmetric; no gross sensory deficits;   SKIN: bruising on face  b/l LE venous stasis changes       LABS:                        10.6   4.04  )-----------( 120      ( 20 Oct 2023 09:08 )             29.5     10-20    129<L>  |  87<L>  |  24<H>  ----------------------------<  64<L>  3.1<L>   |  34<H>  |  0.39<L>    Ca    7.9<L>      20 Oct 2023 09:08  Phos  2.3     10-20  Mg     2.30     10-20            Urinalysis Basic - ( 20 Oct 2023 09:08 )    Color: x / Appearance: x / SG: x / pH: x  Gluc: 64 mg/dL / Ketone: x  / Bili: x / Urobili: x   Blood: x / Protein: x / Nitrite: x   Leuk Esterase: x / RBC: x / WBC x   Sq Epi: x / Non Sq Epi: x / Bacteria: x          RADIOLOGY & ADDITIONAL TESTS:  Results Reviewed:   Imaging Personally Reviewed:  Electrocardiogram Personally Reviewed:    COORDINATION OF CARE:  Care Discussed with Consultants/Other Providers [Y/N]:  Prior or Outpatient Records Reviewed [Y/N]:

## 2023-11-09 DIAGNOSIS — Z86.2 PERSONAL HISTORY OF DISEASES OF THE BLOOD AND BLOOD-FORMING ORGANS AND CERTAIN DISORDERS INVOLVING THE IMMUNE MECHANISM: ICD-10-CM

## 2023-11-09 DIAGNOSIS — K59.00 CONSTIPATION, UNSPECIFIED: ICD-10-CM

## 2023-11-09 DIAGNOSIS — M19.90 UNSPECIFIED OSTEOARTHRITIS, UNSPECIFIED SITE: ICD-10-CM

## 2023-11-09 DIAGNOSIS — E87.1 HYPO-OSMOLALITY AND HYPONATREMIA: ICD-10-CM

## 2023-11-09 DIAGNOSIS — Z87.898 PERSONAL HISTORY OF OTHER SPECIFIED CONDITIONS: ICD-10-CM

## 2023-11-09 DIAGNOSIS — D18.03 HEMANGIOMA OF INTRA-ABDOMINAL STRUCTURES: ICD-10-CM

## 2023-11-09 DIAGNOSIS — N81.10 CYSTOCELE, UNSPECIFIED: ICD-10-CM

## 2023-11-11 ENCOUNTER — APPOINTMENT (OUTPATIENT)
Dept: CT IMAGING | Facility: IMAGING CENTER | Age: 68
End: 2023-11-11
Payer: MEDICARE

## 2023-11-11 ENCOUNTER — OUTPATIENT (OUTPATIENT)
Dept: OUTPATIENT SERVICES | Facility: HOSPITAL | Age: 68
LOS: 1 days | End: 2023-11-11
Payer: COMMERCIAL

## 2023-11-11 DIAGNOSIS — Z00.8 ENCOUNTER FOR OTHER GENERAL EXAMINATION: ICD-10-CM

## 2023-11-11 DIAGNOSIS — Z90.722 ACQUIRED ABSENCE OF OVARIES, BILATERAL: Chronic | ICD-10-CM

## 2023-11-11 PROCEDURE — 74177 CT ABD & PELVIS W/CONTRAST: CPT

## 2023-11-11 PROCEDURE — 74177 CT ABD & PELVIS W/CONTRAST: CPT | Mod: 26

## 2023-11-15 ENCOUNTER — NON-APPOINTMENT (OUTPATIENT)
Age: 68
End: 2023-11-15

## 2023-11-15 ENCOUNTER — APPOINTMENT (OUTPATIENT)
Dept: CARDIOLOGY | Facility: CLINIC | Age: 68
End: 2023-11-15
Payer: MEDICARE

## 2023-11-15 VITALS
BODY MASS INDEX: 16.42 KG/M2 | OXYGEN SATURATION: 97 % | WEIGHT: 87 LBS | HEART RATE: 68 BPM | HEIGHT: 61 IN | DIASTOLIC BLOOD PRESSURE: 59 MMHG | SYSTOLIC BLOOD PRESSURE: 100 MMHG

## 2023-11-15 VITALS — TEMPERATURE: 98.1 F

## 2023-11-15 PROBLEM — I34.0 MITRAL VALVE REGURGITATION: Status: ACTIVE | Noted: 2023-11-09

## 2023-11-15 PROBLEM — E03.9 HYPOTHYROIDISM, UNSPECIFIED TYPE: Status: ACTIVE | Noted: 2023-11-09

## 2023-11-15 PROBLEM — E78.5 HYPERLIPIDEMIA: Status: ACTIVE | Noted: 2023-11-09

## 2023-11-15 PROBLEM — N18.9 CKD (CHRONIC KIDNEY DISEASE): Status: ACTIVE | Noted: 2023-11-09

## 2023-11-15 PROBLEM — Z78.9 NON-SMOKER: Status: ACTIVE | Noted: 2023-11-15

## 2023-11-15 PROBLEM — Z78.9 NO HISTORY OF ALCOHOL USE: Status: ACTIVE | Noted: 2023-11-15

## 2023-11-15 PROCEDURE — 99214 OFFICE O/P EST MOD 30 MIN: CPT

## 2023-11-15 PROCEDURE — 93000 ELECTROCARDIOGRAM COMPLETE: CPT

## 2023-11-15 PROCEDURE — 99204 OFFICE O/P NEW MOD 45 MIN: CPT | Mod: 25

## 2023-11-15 RX ORDER — POTASSIUM CHLORIDE 10 MEQ
10 CAPSULE, EXTENDED RELEASE ORAL
Refills: 0 | Status: DISCONTINUED | COMMUNITY
End: 2023-11-15

## 2023-11-15 RX ORDER — LOSARTAN POTASSIUM 25 MG/1
25 TABLET, FILM COATED ORAL
Refills: 0 | Status: DISCONTINUED | COMMUNITY
End: 2023-11-15

## 2023-11-15 RX ORDER — POTASSIUM CHLORIDE 750 MG/1
10 TABLET, EXTENDED RELEASE ORAL DAILY
Qty: 5 | Refills: 0 | Status: ACTIVE | COMMUNITY

## 2023-11-15 RX ORDER — FUROSEMIDE 40 MG/1
40 TABLET ORAL
Qty: 30 | Refills: 0 | Status: ACTIVE | COMMUNITY

## 2023-11-16 ENCOUNTER — APPOINTMENT (OUTPATIENT)
Dept: CARDIOTHORACIC SURGERY | Facility: CLINIC | Age: 68
End: 2023-11-16
Payer: MEDICARE

## 2023-11-16 VITALS
DIASTOLIC BLOOD PRESSURE: 71 MMHG | BODY MASS INDEX: 17.28 KG/M2 | TEMPERATURE: 97.8 F | HEART RATE: 71 BPM | HEIGHT: 60 IN | WEIGHT: 88 LBS | SYSTOLIC BLOOD PRESSURE: 108 MMHG | RESPIRATION RATE: 14 BRPM | OXYGEN SATURATION: 100 %

## 2023-11-16 DIAGNOSIS — Z78.9 OTHER SPECIFIED HEALTH STATUS: ICD-10-CM

## 2023-11-16 DIAGNOSIS — I34.0 NONRHEUMATIC MITRAL (VALVE) INSUFFICIENCY: ICD-10-CM

## 2023-11-16 DIAGNOSIS — E78.5 HYPERLIPIDEMIA, UNSPECIFIED: ICD-10-CM

## 2023-11-16 DIAGNOSIS — E03.9 HYPOTHYROIDISM, UNSPECIFIED: ICD-10-CM

## 2023-11-16 DIAGNOSIS — N18.9 CHRONIC KIDNEY DISEASE, UNSPECIFIED: ICD-10-CM

## 2023-11-16 PROCEDURE — 99203 OFFICE O/P NEW LOW 30 MIN: CPT

## 2023-11-16 RX ORDER — LOSARTAN POTASSIUM 25 MG/1
25 TABLET, FILM COATED ORAL DAILY
Qty: 60 | Refills: 3 | Status: COMPLETED | COMMUNITY
End: 2023-11-16

## 2023-11-16 RX ORDER — LEVOTHYROXINE SODIUM 0.03 MG/1
25 TABLET ORAL DAILY
Refills: 0 | Status: COMPLETED | COMMUNITY
End: 2023-11-16

## 2023-11-27 ENCOUNTER — APPOINTMENT (OUTPATIENT)
Dept: CARDIOTHORACIC SURGERY | Facility: CLINIC | Age: 68
End: 2023-11-27
Payer: MEDICARE

## 2023-11-27 VITALS
BODY MASS INDEX: 16.99 KG/M2 | WEIGHT: 90 LBS | HEIGHT: 61 IN | SYSTOLIC BLOOD PRESSURE: 102 MMHG | HEART RATE: 80 BPM | DIASTOLIC BLOOD PRESSURE: 64 MMHG | RESPIRATION RATE: 18 BRPM | OXYGEN SATURATION: 100 %

## 2023-11-27 DIAGNOSIS — I50.810 RIGHT HEART FAILURE, UNSPECIFIED: ICD-10-CM

## 2023-11-27 DIAGNOSIS — Z82.49 FAMILY HISTORY OF ISCHEMIC HEART DISEASE AND OTHER DISEASES OF THE CIRCULATORY SYSTEM: ICD-10-CM

## 2023-11-27 DIAGNOSIS — D64.9 ANEMIA, UNSPECIFIED: ICD-10-CM

## 2023-11-27 DIAGNOSIS — I34.0 NONRHEUMATIC MITRAL (VALVE) INSUFFICIENCY: ICD-10-CM

## 2023-11-27 DIAGNOSIS — R60.0 LOCALIZED EDEMA: ICD-10-CM

## 2023-11-27 LAB
ALBUMIN SERPL ELPH-MCNC: 3 G/DL
ALP BLD-CCNC: 85 U/L
ALT SERPL-CCNC: 20 U/L
ANION GAP SERPL CALC-SCNC: 7 MMOL/L
AST SERPL-CCNC: 25 U/L
BILIRUB SERPL-MCNC: <0.2 MG/DL
BUN SERPL-MCNC: 9 MG/DL
CALCIUM SERPL-MCNC: 8.3 MG/DL
CHLORIDE SERPL-SCNC: 97 MMOL/L
CO2 SERPL-SCNC: 30 MMOL/L
CREAT SERPL-MCNC: 0.38 MG/DL
EGFR: 109 ML/MIN/1.73M2
GLUCOSE SERPL-MCNC: 84 MG/DL
NT-PROBNP SERPL-MCNC: 1857 PG/ML
POTASSIUM SERPL-SCNC: 4.3 MMOL/L
PROT SERPL-MCNC: 5 G/DL
SODIUM SERPL-SCNC: 133 MMOL/L

## 2023-11-27 PROCEDURE — 99204 OFFICE O/P NEW MOD 45 MIN: CPT

## 2023-11-27 PROCEDURE — 99214 OFFICE O/P EST MOD 30 MIN: CPT

## 2023-11-28 PROBLEM — D64.9 ANEMIA, UNSPECIFIED TYPE: Status: ACTIVE | Noted: 2023-11-09

## 2023-11-28 PROBLEM — R60.0 BILATERAL LOWER EXTREMITY EDEMA: Status: ACTIVE | Noted: 2023-11-28

## 2023-11-28 PROBLEM — I50.810 CHF WITH RIGHT HEART FAILURE: Status: ACTIVE | Noted: 2023-11-28

## 2023-11-28 PROBLEM — I34.0 MITRAL REGURGITATION: Status: ACTIVE | Noted: 2023-11-28

## 2024-01-08 ENCOUNTER — APPOINTMENT (OUTPATIENT)
Dept: DERMATOLOGY | Facility: CLINIC | Age: 69
End: 2024-01-08
Payer: MEDICARE

## 2024-01-08 DIAGNOSIS — L65.9 NONSCARRING HAIR LOSS, UNSPECIFIED: ICD-10-CM

## 2024-01-08 DIAGNOSIS — C44.91 BASAL CELL CARCINOMA OF SKIN, UNSPECIFIED: ICD-10-CM

## 2024-01-08 DIAGNOSIS — D18.01 HEMANGIOMA OF SKIN AND SUBCUTANEOUS TISSUE: ICD-10-CM

## 2024-01-08 DIAGNOSIS — Z85.820 PERSONAL HISTORY OF MALIGNANT MELANOMA OF SKIN: ICD-10-CM

## 2024-01-08 DIAGNOSIS — D22.9 MELANOCYTIC NEVI, UNSPECIFIED: ICD-10-CM

## 2024-01-08 DIAGNOSIS — Z12.83 ENCOUNTER FOR SCREENING FOR MALIGNANT NEOPLASM OF SKIN: ICD-10-CM

## 2024-01-08 PROCEDURE — 99213 OFFICE O/P EST LOW 20 MIN: CPT

## 2024-05-05 ENCOUNTER — NON-APPOINTMENT (OUTPATIENT)
Age: 69
End: 2024-05-05

## 2024-05-08 ENCOUNTER — NON-APPOINTMENT (OUTPATIENT)
Age: 69
End: 2024-05-08

## 2024-05-08 ENCOUNTER — APPOINTMENT (OUTPATIENT)
Dept: THORACIC SURGERY | Facility: CLINIC | Age: 69
End: 2024-05-08
Payer: MEDICARE

## 2024-05-08 VITALS
SYSTOLIC BLOOD PRESSURE: 112 MMHG | WEIGHT: 77 LBS | HEIGHT: 61 IN | DIASTOLIC BLOOD PRESSURE: 73 MMHG | OXYGEN SATURATION: 100 % | RESPIRATION RATE: 16 BRPM | HEART RATE: 62 BPM | BODY MASS INDEX: 14.54 KG/M2

## 2024-05-08 DIAGNOSIS — Z87.891 PERSONAL HISTORY OF NICOTINE DEPENDENCE: ICD-10-CM

## 2024-05-08 DIAGNOSIS — R91.8 OTHER NONSPECIFIC ABNORMAL FINDING OF LUNG FIELD: ICD-10-CM

## 2024-05-08 PROCEDURE — 99205 OFFICE O/P NEW HI 60 MIN: CPT

## 2024-05-08 RX ORDER — FUROSEMIDE 40 MG/1
40 TABLET ORAL
Refills: 0 | Status: ACTIVE | COMMUNITY

## 2024-05-08 RX ORDER — LEVOTHYROXINE SODIUM 137 UG/1
TABLET ORAL
Refills: 0 | Status: ACTIVE | COMMUNITY

## 2024-05-08 NOTE — ASSESSMENT
[FreeTextEntry1] : Ms. 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 Follows with Dr. Briseno. Referred for further evaluation regarding Bronch EBUS, Lower airway culture/ biopsy.   I have independently reviewed the medical records and imaging at the time of this office consultation, and discussed the following interpretations with the patient: - Discussed necessity of diagnosis, which will be needed to dictate further treatment. Therefore, discussed Flex bronch, BAL, possible biopsy. Risks, benefits and alternatives explained to patient; All questions answered and patient agrees to proceed with surgery.  - Cardiac clearance required prior to procedure.   Recommendations reviewed with patient during this office visit, and all questions answered; Patient instructed on the importance of follow up and verbalizes understanding.  I, LUBA VerdeIM, personally performed the evaluation and management (E/M) services for this new patient. That E/M includes conducting the initial examination, assessing all conditions, and establishing the plan of care. Today, My ACP, Merry Javier, was here to observe my evaluation and management services for this patient to be followed going forward.

## 2024-05-08 NOTE — HISTORY OF PRESENT ILLNESS
[FreeTextEntry1] : Ms. 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 Follows with Dr. Briseno. Referred for further evaluation regarding Bronch EBUS, Lower airway culture/ biopsy.   CT Chest on 4/8/2024: ([facility]) - New cluster of left medial left apical nodules (image 40-45/series 6 ), the largest measuring 0.3 cm.  - Two stable lateral MIKE 0.2 cm adjacent nodules  (image 118/series 6). - Stable lateral lingula 0.3 cm nodule (image 130/series 6).  - New medial anterior MIKE nodules measuring 0.2 and 0.4 cm (images 108, 110/series 6).  - Lateral RUL tree-in-bud type reticulonodular infiltrate (images /series 6), increased from the prior exam. The largest nodule measures 0.7 cm. The prior largest nodule measuring 0.9 cm is resolved. Some of the nodules may be endobronchial mucous plugging.   - RML tree-in-bud type reticulonodular infiltrate which is increased.  - Stable lateral RLL 0.4 cm nodule (image 126/series 6) - New posterior RLL 0.5 cm nodule (image 133/series 6).  - Postinflammatory bilateral linear stranding.  - Areas of likely mucous plugging are increased as above. Branching opacity in the RML is increased and likely endobronchial measuring up to 2.2 x 0.7 cm (images 141-155/series 6). - There is a hypodense right lobe liver mass measuring 4.4 x 4.6 cm responding to a previously demonstrated hemangioma.  OF NOTE: Follows with Dr. Dominguez ([facility]). Currently undergoing workup for abnormal blood work Dr. Nixon (Card  Patient presents today for consultation. Denies hemoptysis, fevers, or chills.

## 2024-05-08 NOTE — PHYSICAL EXAM
[Fully active, able to carry on all pre-disease performance without restriction] : Status 0 - Fully active, able to carry on all pre-disease performance without restriction [General Appearance - Alert] : alert [General Appearance - In No Acute Distress] : in no acute distress [General Appearance - Well Nourished] : well nourished [Sclera] : the sclera and conjunctiva were normal [Extraocular Movements] : extraocular movements were intact [Strabismus] : no strabismus was seen [Outer Ear] : the ears and nose were normal in appearance [Hearing Threshold Finger Rub Not [place]] : hearing was normal [Neck Appearance] : the appearance of the neck was normal [Neck Cervical Mass (___cm)] : no neck mass was observed [] : no respiratory distress [Respiration, Rhythm And Depth] : normal respiratory rhythm and effort [Exaggerated Use Of Accessory Muscles For Inspiration] : no accessory muscle use [Auscultation Breath Sounds / Voice Sounds] : lungs were clear to auscultation bilaterally [Heart Rate And Rhythm] : heart rate was normal and rhythm regular [Examination Of The Chest] : the chest was normal in appearance [Chest Visual Inspection Thoracic Asymmetry] : no chest asymmetry [Diminished Respiratory Excursion] : normal chest expansion [2+] : left 2+ [Breast Appearance] : normal in appearance [Breast Palpation Mass] : no palpable masses [Bowel Sounds] : normal bowel sounds [Abdomen Soft] : soft [Abdomen Tenderness] : non-tender [Cervical Lymph Nodes Enlarged Posterior Bilaterally] : posterior cervical [Cervical Lymph Nodes Enlarged Anterior Bilaterally] : anterior cervical [Supraclavicular Lymph Nodes Enlarged Bilaterally] : supraclavicular [No CVA Tenderness] : no ~M costovertebral angle tenderness [No Spinal Tenderness] : no spinal tenderness [Involuntary Movements] : no involuntary movements were seen [Skin Color & Pigmentation] : normal skin color and pigmentation [No Focal Deficits] : no focal deficits [Oriented To Time, Place, And Person] : oriented to person, place, and time [FreeTextEntry1] : Deferred

## 2024-05-17 ENCOUNTER — OUTPATIENT (OUTPATIENT)
Dept: OUTPATIENT SERVICES | Facility: HOSPITAL | Age: 69
LOS: 1 days | End: 2024-05-17

## 2024-05-17 VITALS
OXYGEN SATURATION: 97 % | HEART RATE: 62 BPM | HEIGHT: 61 IN | RESPIRATION RATE: 14 BRPM | WEIGHT: 74.96 LBS | SYSTOLIC BLOOD PRESSURE: 103 MMHG | TEMPERATURE: 98 F | DIASTOLIC BLOOD PRESSURE: 68 MMHG

## 2024-05-17 DIAGNOSIS — Z90.722 ACQUIRED ABSENCE OF OVARIES, BILATERAL: Chronic | ICD-10-CM

## 2024-05-17 DIAGNOSIS — R91.8 OTHER NONSPECIFIC ABNORMAL FINDING OF LUNG FIELD: ICD-10-CM

## 2024-05-17 DIAGNOSIS — Z98.890 OTHER SPECIFIED POSTPROCEDURAL STATES: Chronic | ICD-10-CM

## 2024-05-17 RX ORDER — FUROSEMIDE 40 MG
1.5 TABLET ORAL
Qty: 0 | Refills: 0 | DISCHARGE

## 2024-05-17 RX ORDER — SODIUM CHLORIDE 9 MG/ML
1000 INJECTION, SOLUTION INTRAVENOUS
Refills: 0 | Status: DISCONTINUED | OUTPATIENT
Start: 2024-05-23 | End: 2024-06-06

## 2024-05-17 NOTE — H&P PST ADULT - HISTORY OF PRESENT ILLNESS
69y/o female scheduled for flexible bronchoscopy, bronchial alveolar lavage, possible biopsy on 5/23/2024.  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 Branching opacity in the RML is increased and likely endobronchial. 69y/o female scheduled for flexible bronchoscopy, bronchial alveolar lavage, possible biopsy on 5/23/2024.  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 Branching opacity in the RML is increased and likely endobronchial.

## 2024-05-17 NOTE — H&P PST ADULT - LAST ECHOCARDIOGRAM
10/2023 TTE 10/2023 sunrise JAVIER EF 55- 60%, left ventricular systolic function nl, severe mitral valve regurg, MVP, patent foramen ovale

## 2024-05-17 NOTE — H&P PST ADULT - OTHER CARE PROVIDERS
Dr. Dominguez oncologist/ hematologist [phone], Dr. ELIS Singleton cardiologist [phone], Dr. Briseno [facility]

## 2024-05-17 NOTE — H&P PST ADULT - NSICDXPASTMEDICALHX_GEN_ALL_CORE_FT
PAST MEDICAL HISTORY:  Anemia     CA skin, basal cell     Chronic kidney disease (CKD)     Constipation     Cystocele, unspecified     Edema leg     H/O melanoma excision     History of thrombocytopenia     HLD (hyperlipidemia)     Hypothyroidism     Liver hemangioma     Mitral valve regurgitation     OA (osteoarthritis)     Pulmonary nodules      PAST MEDICAL HISTORY:  Anemia     CA skin, basal cell     Chronic kidney disease (CKD)     Constipation     Cystocele, unspecified     Edema leg     H/O melanoma excision     History of thrombocytopenia     HLD (hyperlipidemia)     Hypothyroidism     Liver hemangioma     Mitral valve regurgitation     MVP (mitral valve prolapse)     OA (osteoarthritis)     Pulmonary nodules

## 2024-05-17 NOTE — H&P PST ADULT - GASTROINTESTINAL
details… soft/nontender/nondistended/normal active bowel sounds/no guarding/no rigidity/no organomegaly/no palpable meño/no masses palpable

## 2024-05-17 NOTE — H&P PST ADULT - PROBLEM SELECTOR PLAN 1
Pt scheduled for flexible bronchoscopy, bronchial alveolar lavage, possible biopsy on 5/23/2024.  Labs bmp, cbc done 4/1/2024 with hematologist results in chart, ekg in chart.  Preop teaching done, pt able to verbalize understanding.   medication day of procedure- pepcid, synthroid

## 2024-05-17 NOTE — H&P PST ADULT - GENERAL COMMENTS
due hospitalization  10/2023- for abnormal labs, leg edema.  developed weakness-  overtime regaining strength

## 2024-05-22 ENCOUNTER — TRANSCRIPTION ENCOUNTER (OUTPATIENT)
Age: 69
End: 2024-05-22

## 2024-05-22 NOTE — ASU PATIENT PROFILE, ADULT - PAIN SCALE PREFERRED, PROFILE
Patient stated that she has been on two medications and a very strict diet for her GI problems. None of this seems to be working. She is wondering what the next steps are for her. Please call her back.    numerical 0-10

## 2024-05-22 NOTE — ASU PATIENT PROFILE, ADULT - NSICDXPASTMEDICALHX_GEN_ALL_CORE_FT
PAST MEDICAL HISTORY:  Anemia     CA skin, basal cell     Chronic kidney disease (CKD)     Constipation     Cystocele, unspecified     Edema leg     H/O melanoma excision     History of thrombocytopenia     HLD (hyperlipidemia)     Hypothyroidism     Liver hemangioma     Mitral valve regurgitation     MVP (mitral valve prolapse)     OA (osteoarthritis)     Pulmonary nodules

## 2024-05-22 NOTE — ASU PATIENT PROFILE, ADULT - TOBACCO USE
Patient called back stating she is ready to schedule procedure. Patient can be reached at [phone]. Referred by A Siehr.  Patient states want to schedule before end of year.   Former smoker

## 2024-05-22 NOTE — ASU PATIENT PROFILE, ADULT - FALL HARM RISK - HARM RISK INTERVENTIONS

## 2024-05-22 NOTE — ASU PATIENT PROFILE, ADULT - DOES PATIENT HAVE ADVANCE DIRECTIVE
explained and provided/No Isotretinoin Counseling: Patient should get monthly blood tests, not donate blood, not drive at night if vision affected, not share medication, and not undergo elective surgery for 6 months after tx completed. Side effects reviewed, pt to contact office should one occur.

## 2024-05-23 ENCOUNTER — TRANSCRIPTION ENCOUNTER (OUTPATIENT)
Age: 69
End: 2024-05-23

## 2024-05-23 ENCOUNTER — OUTPATIENT (OUTPATIENT)
Dept: OUTPATIENT SERVICES | Facility: HOSPITAL | Age: 69
LOS: 1 days | Discharge: HOME | End: 2024-05-23
Payer: MEDICARE

## 2024-05-23 ENCOUNTER — RESULT REVIEW (OUTPATIENT)
Age: 69
End: 2024-05-23

## 2024-05-23 ENCOUNTER — APPOINTMENT (OUTPATIENT)
Dept: THORACIC SURGERY | Facility: HOSPITAL | Age: 69
End: 2024-05-23

## 2024-05-23 VITALS
HEIGHT: 61 IN | DIASTOLIC BLOOD PRESSURE: 48 MMHG | WEIGHT: 74.96 LBS | TEMPERATURE: 98 F | OXYGEN SATURATION: 100 % | RESPIRATION RATE: 18 BRPM | SYSTOLIC BLOOD PRESSURE: 107 MMHG | HEART RATE: 53 BPM

## 2024-05-23 VITALS
RESPIRATION RATE: 14 BRPM | HEART RATE: 55 BPM | SYSTOLIC BLOOD PRESSURE: 122 MMHG | DIASTOLIC BLOOD PRESSURE: 64 MMHG | OXYGEN SATURATION: 100 %

## 2024-05-23 DIAGNOSIS — Z90.722 ACQUIRED ABSENCE OF OVARIES, BILATERAL: Chronic | ICD-10-CM

## 2024-05-23 DIAGNOSIS — Z98.890 OTHER SPECIFIED POSTPROCEDURAL STATES: Chronic | ICD-10-CM

## 2024-05-23 DIAGNOSIS — R91.8 OTHER NONSPECIFIC ABNORMAL FINDING OF LUNG FIELD: ICD-10-CM

## 2024-05-23 LAB
GRAM STN FLD: SIGNIFICANT CHANGE UP
NIGHT BLUE STAIN TISS: SIGNIFICANT CHANGE UP
SPECIMEN SOURCE: SIGNIFICANT CHANGE UP
SPECIMEN SOURCE: SIGNIFICANT CHANGE UP

## 2024-05-23 PROCEDURE — 31624 DX BRONCHOSCOPE/LAVAGE: CPT

## 2024-05-23 PROCEDURE — 88112 CYTOPATH CELL ENHANCE TECH: CPT | Mod: 26

## 2024-05-23 PROCEDURE — 88305 TISSUE EXAM BY PATHOLOGIST: CPT | Mod: 26

## 2024-05-23 RX ORDER — OXYCODONE HYDROCHLORIDE 5 MG/1
5 TABLET ORAL ONCE
Refills: 0 | Status: DISCONTINUED | OUTPATIENT
Start: 2024-05-23 | End: 2024-05-23

## 2024-05-23 RX ORDER — VITAMIN E 100 UNIT
0 CAPSULE ORAL
Refills: 0 | DISCHARGE

## 2024-05-23 RX ORDER — PREGABALIN 225 MG/1
0 CAPSULE ORAL
Refills: 0 | DISCHARGE

## 2024-05-23 RX ORDER — FENTANYL CITRATE 50 UG/ML
25 INJECTION INTRAVENOUS
Refills: 0 | Status: DISCONTINUED | OUTPATIENT
Start: 2024-05-23 | End: 2024-05-23

## 2024-05-23 RX ORDER — ONDANSETRON 8 MG/1
4 TABLET, FILM COATED ORAL ONCE
Refills: 0 | Status: DISCONTINUED | OUTPATIENT
Start: 2024-05-23 | End: 2024-06-06

## 2024-05-23 RX ORDER — ASPIRIN/CALCIUM CARB/MAGNESIUM 324 MG
1 TABLET ORAL
Refills: 0 | DISCHARGE

## 2024-05-23 RX ORDER — LEVOTHYROXINE SODIUM 125 MCG
1 TABLET ORAL
Refills: 0 | DISCHARGE

## 2024-05-23 RX ORDER — FUROSEMIDE 40 MG
1 TABLET ORAL
Refills: 0 | DISCHARGE

## 2024-05-23 RX ORDER — FERROUS SULFATE 325(65) MG
0 TABLET ORAL
Refills: 0 | DISCHARGE

## 2024-05-23 NOTE — ASU PREOP CHECKLIST - NSSDAENDDT_GEN_ALL_CORE
Upon review of the In Basket request we were able to locate, review, and update the patient chart as requested for Pap Smear (HPV) aka Cervical Cancer Screening  Any additional questions or concerns should be emailed to the Practice Liaisons via the appropriate education email address, please do not reply via In Basket      Thank you  Donny Villarreal MA 23-May-2024 07:42 23-May-2024 07:47

## 2024-05-23 NOTE — ASU DISCHARGE PLAN (ADULT/PEDIATRIC) - FOLLOW UP APPOINTMENTS
[facility] [place] may also call Recovery Room (PACU) 24/7 @ [phone]

## 2024-05-23 NOTE — ASU DISCHARGE PLAN (ADULT/PEDIATRIC) - CARE PROVIDER_API CALL
Peewee Briseno  Pulmonary Disease  [id number] [address] [place] [address]  Phone: [phone]  Fax: [phone]  Follow Up Time: 2 weeks

## 2024-05-23 NOTE — ASU DISCHARGE PLAN (ADULT/PEDIATRIC) - COMMENTS
Please follow up with your pulmonologist, Dr. Peewee Briseno, in 2 weeks to discuss results. Call his office to make an appointment

## 2024-05-23 NOTE — BRIEF OPERATIVE NOTE - NSICDXBRIEFPROCEDURE_GEN_ALL_CORE_FT
PROCEDURES:  Diagnostic bronchoscopy with bronchoalveolar lavage 23-May-2024 07:37:59  Brenden Kevin

## 2024-05-24 LAB
CULTURE RESULTS: SIGNIFICANT CHANGE UP
SPECIMEN SOURCE: SIGNIFICANT CHANGE UP

## 2024-05-28 LAB — NON-GYNECOLOGICAL CYTOLOGY STUDY: SIGNIFICANT CHANGE UP

## 2024-06-22 LAB
CULTURE RESULTS: SIGNIFICANT CHANGE UP
SPECIMEN SOURCE: SIGNIFICANT CHANGE UP

## 2024-07-07 ENCOUNTER — NON-APPOINTMENT (OUTPATIENT)
Age: 69
End: 2024-07-07

## 2024-07-08 ENCOUNTER — APPOINTMENT (OUTPATIENT)
Dept: DERMATOLOGY | Facility: CLINIC | Age: 69
End: 2024-07-08
Payer: MEDICARE

## 2024-07-08 VITALS — BODY MASS INDEX: 14.55 KG/M2 | WEIGHT: 77 LBS

## 2024-07-08 DIAGNOSIS — D18.01 HEMANGIOMA OF SKIN AND SUBCUTANEOUS TISSUE: ICD-10-CM

## 2024-07-08 DIAGNOSIS — D22.9 MELANOCYTIC NEVI, UNSPECIFIED: ICD-10-CM

## 2024-07-08 DIAGNOSIS — Z12.83 ENCOUNTER FOR SCREENING FOR MALIGNANT NEOPLASM OF SKIN: ICD-10-CM

## 2024-07-08 PROCEDURE — 99213 OFFICE O/P EST LOW 20 MIN: CPT

## 2024-11-15 RX ORDER — FUROSEMIDE 40 MG/1
40 TABLET ORAL TWICE DAILY
Qty: 180 | Refills: 2 | Status: ACTIVE | COMMUNITY
Start: 2024-11-15 | End: 1900-01-01

## 2025-01-07 ENCOUNTER — APPOINTMENT (OUTPATIENT)
Dept: DERMATOLOGY | Facility: CLINIC | Age: 70
End: 2025-01-07
Payer: MEDICARE

## 2025-01-07 VITALS — BODY MASS INDEX: 14.72 KG/M2 | HEIGHT: 60 IN | WEIGHT: 75 LBS

## 2025-01-07 PROCEDURE — 99213 OFFICE O/P EST LOW 20 MIN: CPT

## 2025-01-07 PROCEDURE — G2211 COMPLEX E/M VISIT ADD ON: CPT

## 2025-03-03 ENCOUNTER — APPOINTMENT (OUTPATIENT)
Dept: ORTHOPEDIC SURGERY | Facility: CLINIC | Age: 70
End: 2025-03-03
Payer: MEDICARE

## 2025-03-03 VITALS — HEIGHT: 60 IN | WEIGHT: 75 LBS | BODY MASS INDEX: 14.72 KG/M2

## 2025-03-03 DIAGNOSIS — M40.209 UNSPECIFIED KYPHOSIS, SITE UNSPECIFIED: ICD-10-CM

## 2025-03-03 DIAGNOSIS — M54.16 RADICULOPATHY, LUMBAR REGION: ICD-10-CM

## 2025-03-03 DIAGNOSIS — S22.000A WEDGE COMPRESSION FRACTURE OF UNSPECIFIED THORACIC VERTEBRA, INITIAL ENCOUNTER FOR CLOSED FRACTURE: ICD-10-CM

## 2025-03-03 PROBLEM — S22.009A CLOSED FRACTURE OF THORACIC VERTEBRA: Status: ACTIVE | Noted: 2025-03-03

## 2025-03-03 PROCEDURE — 99203 OFFICE O/P NEW LOW 30 MIN: CPT

## 2025-03-04 ENCOUNTER — NON-APPOINTMENT (OUTPATIENT)
Age: 70
End: 2025-03-04

## 2025-03-07 RX ORDER — TRAMADOL HYDROCHLORIDE 50 MG/1
50 TABLET, COATED ORAL EVERY 8 HOURS
Qty: 21 | Refills: 0 | Status: ACTIVE | COMMUNITY
Start: 2025-03-07 | End: 1900-01-01

## 2025-03-12 ENCOUNTER — NON-APPOINTMENT (OUTPATIENT)
Age: 70
End: 2025-03-12

## 2025-03-12 ENCOUNTER — APPOINTMENT (OUTPATIENT)
Dept: CARDIOLOGY | Facility: CLINIC | Age: 70
End: 2025-03-12
Payer: MEDICARE

## 2025-03-12 VITALS
WEIGHT: 72 LBS | HEIGHT: 59.84 IN | DIASTOLIC BLOOD PRESSURE: 60 MMHG | RESPIRATION RATE: 18 BRPM | OXYGEN SATURATION: 98 % | HEART RATE: 65 BPM | SYSTOLIC BLOOD PRESSURE: 90 MMHG | BODY MASS INDEX: 14.14 KG/M2

## 2025-03-12 DIAGNOSIS — I34.1 NONRHEUMATIC MITRAL (VALVE) PROLAPSE: ICD-10-CM

## 2025-03-12 DIAGNOSIS — R63.6 UNDERWEIGHT: ICD-10-CM

## 2025-03-12 DIAGNOSIS — I50.32 CHRONIC DIASTOLIC (CONGESTIVE) HEART FAILURE: ICD-10-CM

## 2025-03-12 DIAGNOSIS — I34.0 NONRHEUMATIC MITRAL (VALVE) INSUFFICIENCY: ICD-10-CM

## 2025-03-12 PROCEDURE — 93000 ELECTROCARDIOGRAM COMPLETE: CPT

## 2025-03-12 PROCEDURE — G2211 COMPLEX E/M VISIT ADD ON: CPT

## 2025-03-12 PROCEDURE — 99214 OFFICE O/P EST MOD 30 MIN: CPT

## 2025-03-12 RX ORDER — LEVOTHYROXINE SODIUM 25 UG/1
25 TABLET ORAL
Refills: 0 | Status: ACTIVE | COMMUNITY

## 2025-03-12 RX ORDER — CALCIUM CARB, CITRATE, MALATE 250 MG
250 CAPSULE ORAL
Refills: 0 | Status: ACTIVE | COMMUNITY

## 2025-03-12 RX ORDER — MULTIVIT-MIN/FOLIC/VIT K/LYCOP 400-300MCG
1000 TABLET ORAL
Refills: 0 | Status: ACTIVE | COMMUNITY

## 2025-03-14 RX ORDER — TRAMADOL HYDROCHLORIDE 50 MG/1
50 TABLET, COATED ORAL EVERY 8 HOURS
Qty: 21 | Refills: 0 | Status: ACTIVE | COMMUNITY
Start: 2025-03-14 | End: 1900-01-01

## 2025-03-19 ENCOUNTER — APPOINTMENT (OUTPATIENT)
Dept: ORTHOPEDIC SURGERY | Facility: CLINIC | Age: 70
End: 2025-03-19
Payer: MEDICARE

## 2025-03-19 VITALS — HEIGHT: 59 IN | BODY MASS INDEX: 14.52 KG/M2 | WEIGHT: 72 LBS

## 2025-03-19 DIAGNOSIS — S22.009A UNSPECIFIED FRACTURE OF UNSPECIFIED THORACIC VERTEBRA, INITIAL ENCOUNTER FOR CLOSED FRACTURE: ICD-10-CM

## 2025-03-19 DIAGNOSIS — S22.000A WEDGE COMPRESSION FRACTURE OF UNSPECIFIED THORACIC VERTEBRA, INITIAL ENCOUNTER FOR CLOSED FRACTURE: ICD-10-CM

## 2025-03-19 PROCEDURE — 99214 OFFICE O/P EST MOD 30 MIN: CPT

## 2025-04-10 ENCOUNTER — NON-APPOINTMENT (OUTPATIENT)
Age: 70
End: 2025-04-10

## 2025-06-30 ENCOUNTER — APPOINTMENT (OUTPATIENT)
Dept: CARDIOLOGY | Facility: CLINIC | Age: 70
End: 2025-06-30

## 2025-06-30 PROCEDURE — 93306 TTE W/DOPPLER COMPLETE: CPT

## 2025-07-14 ENCOUNTER — NON-APPOINTMENT (OUTPATIENT)
Age: 70
End: 2025-07-14

## 2025-07-14 ENCOUNTER — APPOINTMENT (OUTPATIENT)
Dept: CARDIOLOGY | Facility: CLINIC | Age: 70
End: 2025-07-14
Payer: MEDICARE

## 2025-07-14 VITALS
OXYGEN SATURATION: 98 % | HEART RATE: 58 BPM | RESPIRATION RATE: 17 BRPM | SYSTOLIC BLOOD PRESSURE: 90 MMHG | BODY MASS INDEX: 14.14 KG/M2 | DIASTOLIC BLOOD PRESSURE: 60 MMHG | HEIGHT: 59.65 IN | WEIGHT: 72 LBS

## 2025-07-14 PROBLEM — R63.0 ANOREXIA: Status: ACTIVE | Noted: 2025-07-14

## 2025-07-14 PROCEDURE — 93000 ELECTROCARDIOGRAM COMPLETE: CPT

## 2025-07-14 PROCEDURE — 99214 OFFICE O/P EST MOD 30 MIN: CPT

## 2025-07-14 PROCEDURE — G2211 COMPLEX E/M VISIT ADD ON: CPT

## 2025-07-14 RX ORDER — GABAPENTIN 100 MG/1
100 CAPSULE ORAL
Refills: 0 | Status: ACTIVE | COMMUNITY

## 2025-07-17 ENCOUNTER — APPOINTMENT (OUTPATIENT)
Dept: DERMATOLOGY | Facility: CLINIC | Age: 70
End: 2025-07-17

## 2025-07-17 PROCEDURE — 17000 DESTRUCT PREMALG LESION: CPT

## 2025-07-17 PROCEDURE — 99213 OFFICE O/P EST LOW 20 MIN: CPT | Mod: 25

## 2025-08-18 ENCOUNTER — RX RENEWAL (OUTPATIENT)
Age: 70
End: 2025-08-18

## 2025-08-19 ENCOUNTER — TRANSCRIPTION ENCOUNTER (OUTPATIENT)
Age: 70
End: 2025-08-19

## 2025-09-16 ENCOUNTER — NON-APPOINTMENT (OUTPATIENT)
Age: 70
End: 2025-09-16

## 2025-09-16 ENCOUNTER — APPOINTMENT (OUTPATIENT)
Dept: CARDIOLOGY | Facility: CLINIC | Age: 70
End: 2025-09-16
Payer: MEDICARE

## 2025-09-16 VITALS
HEART RATE: 59 BPM | RESPIRATION RATE: 16 BRPM | SYSTOLIC BLOOD PRESSURE: 108 MMHG | OXYGEN SATURATION: 100 % | WEIGHT: 74.2 LBS | BODY MASS INDEX: 14.57 KG/M2 | HEIGHT: 59.84 IN | DIASTOLIC BLOOD PRESSURE: 62 MMHG

## 2025-09-16 DIAGNOSIS — Z01.810 ENCOUNTER FOR PREPROCEDURAL CARDIOVASCULAR EXAMINATION: ICD-10-CM

## 2025-09-16 DIAGNOSIS — I34.0 NONRHEUMATIC MITRAL (VALVE) INSUFFICIENCY: ICD-10-CM

## 2025-09-16 DIAGNOSIS — R63.0 ANOREXIA: ICD-10-CM

## 2025-09-16 DIAGNOSIS — E78.5 HYPERLIPIDEMIA, UNSPECIFIED: ICD-10-CM

## 2025-09-16 DIAGNOSIS — D64.9 ANEMIA, UNSPECIFIED: ICD-10-CM

## 2025-09-16 DIAGNOSIS — I50.32 CHRONIC DIASTOLIC (CONGESTIVE) HEART FAILURE: ICD-10-CM

## 2025-09-16 DIAGNOSIS — I34.1 NONRHEUMATIC MITRAL (VALVE) PROLAPSE: ICD-10-CM

## 2025-09-16 PROCEDURE — 99214 OFFICE O/P EST MOD 30 MIN: CPT

## 2025-09-16 PROCEDURE — 93000 ELECTROCARDIOGRAM COMPLETE: CPT | Mod: NC

## 2025-09-16 PROCEDURE — G2211 COMPLEX E/M VISIT ADD ON: CPT

## 2025-09-16 RX ORDER — MECOBALAMIN 1000 MCG
1 TABLET,CHEWABLE ORAL
Refills: 0 | Status: ACTIVE | COMMUNITY
Start: 2025-09-16

## (undated) DEVICE — WARMING BLANKET LOWER ADULT

## (undated) DEVICE — TRAP SPECIMEN SPUTUM 40CC

## (undated) DEVICE — SYR SLIP 10CC

## (undated) DEVICE — VALVE BIOPSY BRONCHOVIDEOSCOPE

## (undated) DEVICE — DURABLE MEDICAL EQUIPMENT: Type: DURABLE MEDICAL EQUIPMENT

## (undated) DEVICE — SOL IRR POUR NS 0.9% 500ML

## (undated) DEVICE — ADAPTER FIBEROPTIC BRONCHOSCOPE DUAL AXIS SWIVEL

## (undated) DEVICE — PROTECTOR HEEL / ELBOW FLUFFY

## (undated) DEVICE — DRSG TELFA 3 X 8

## (undated) DEVICE — POSITIONER STRAP ARMBOARD VELCRO TS-30

## (undated) DEVICE — TUBING SUCTION NONCONDUCTIVE 6MM X 12FT

## (undated) DEVICE — VALVE SUCTION EVIS 160/200/240

## (undated) DEVICE — DRAPE 3/4 SHEET 52X76"

## (undated) DEVICE — ELCTR GROUNDING PAD ADULT COVIDIEN

## (undated) DEVICE — VENODYNE/SCD SLEEVE CALF MEDIUM

## (undated) DEVICE — DRSG CURITY GAUZE SPONGE 4 X 4" 12-PLY